# Patient Record
Sex: MALE | Race: WHITE | URBAN - METROPOLITAN AREA
[De-identification: names, ages, dates, MRNs, and addresses within clinical notes are randomized per-mention and may not be internally consistent; named-entity substitution may affect disease eponyms.]

---

## 2019-09-26 ENCOUNTER — OFFICE VISIT (OUTPATIENT)
Dept: FAMILY MEDICINE | Facility: CLINIC | Age: 79
End: 2019-09-26
Payer: MEDICARE

## 2019-09-26 DIAGNOSIS — R73.9 ELEVATED BLOOD SUGAR: ICD-10-CM

## 2019-09-26 DIAGNOSIS — E78.01 FAMILIAL HYPERCHOLESTEROLEMIA: ICD-10-CM

## 2019-09-26 DIAGNOSIS — Z13.29 SCREENING FOR ENDOCRINE DISORDER: ICD-10-CM

## 2019-09-26 DIAGNOSIS — I10 ESSENTIAL HYPERTENSION: Primary | ICD-10-CM

## 2019-09-26 DIAGNOSIS — Z13.89 SCREENING FOR BLOOD OR PROTEIN IN URINE: ICD-10-CM

## 2019-09-26 DIAGNOSIS — J30.9 ALLERGIC RHINITIS, UNSPECIFIED SEASONALITY, UNSPECIFIED TRIGGER: ICD-10-CM

## 2019-09-26 PROCEDURE — 99213 PR OFFICE/OUTPT VISIT, EST, LEVL III, 20-29 MIN: ICD-10-PCS | Mod: S$GLB,,, | Performed by: FAMILY MEDICINE

## 2019-09-26 PROCEDURE — 99213 OFFICE O/P EST LOW 20 MIN: CPT | Mod: S$GLB,,, | Performed by: FAMILY MEDICINE

## 2019-09-26 RX ORDER — AMLODIPINE AND BENAZEPRIL HYDROCHLORIDE 5; 20 MG/1; MG/1
CAPSULE ORAL
Refills: 3 | COMMUNITY
Start: 2019-07-29 | End: 2023-11-27 | Stop reason: SDUPTHER

## 2019-09-26 RX ORDER — OXYMETAZOLINE HCL 0.05 %
SPRAY, NON-AEROSOL (ML) NASAL
COMMUNITY
End: 2020-05-06

## 2019-09-26 RX ORDER — NAPROXEN SODIUM 220 MG/1
TABLET, FILM COATED ORAL
COMMUNITY

## 2019-09-26 RX ORDER — FLUTICASONE PROPIONATE 50 MCG
SPRAY, SUSPENSION (ML) NASAL
COMMUNITY
Start: 2014-11-05

## 2019-09-26 RX ORDER — PRAVASTATIN SODIUM 40 MG/1
TABLET ORAL
Refills: 3 | COMMUNITY
Start: 2019-07-16

## 2019-09-26 RX ORDER — ATENOLOL 25 MG/1
TABLET ORAL
Refills: 7 | COMMUNITY
Start: 2019-07-29 | End: 2024-01-18 | Stop reason: SDUPTHER

## 2019-09-26 NOTE — PROGRESS NOTES
SUBJECTIVE:    Patient ID: Watson Mcgee is a 78 y.o. male.    Chief Complaint: Hypertension (check up) and Hyperlipidemia    Pt here to checkup to HTN and allergic rhinitis.    HTN is controlled.  Continues to see Dr. Solis  Allergic rhinitis are controlled on flonase, when he uses it.      No visits with results within 6 Month(s) from this visit.   Latest known visit with results is:   No results found for any previous visit.       Past Medical History:   Diagnosis Date    Allergy     Hyperlipidemia     Hypertension      History reviewed. No pertinent surgical history.  History reviewed. No pertinent family history.    Marital Status: Single  Alcohol History:  reports that he drinks about 21.0 standard drinks of alcohol per week.  Tobacco History:  reports that he has never smoked. He has never used smokeless tobacco.  Drug History:  has no drug history on file.    Review of patient's allergies indicates:   Allergen Reactions    Clindamycin hcl      Other reaction(s): Unknown    Penicillin g potassium      Other reaction(s): Unknown       Current Outpatient Medications:     amlodipine-benazepril 5-20 mg (LOTREL) 5-20 mg per capsule, TK 1 C PO D, Disp: , Rfl: 3    aspirin 81 MG Chew, 1 tablet, Disp: , Rfl:     atenolol (TENORMIN) 25 MG tablet, TK 1 T PO D, Disp: , Rfl: 7    fluticasone propionate (FLONASE ALLERGY RELIEF) 50 mcg/actuation nasal spray, 1 spray in each nostril, Disp: , Rfl:     multivitamin-iron-folic acid (CENTRAL-ADRIENNE) Tab, 1 tablet, Disp: , Rfl:     oxymetazoline (AFRIN) 0.05 % nasal spray, 2 drops as needed, Disp: , Rfl:     pravastatin (PRAVACHOL) 40 MG tablet, TK 1 T PO  QHS, Disp: , Rfl: 3    Review of Systems   Constitutional: Negative for appetite change, fatigue, fever and unexpected weight change.   Respiratory: Negative for cough, chest tightness, shortness of breath and wheezing.    Cardiovascular: Negative for chest pain and leg swelling.   Gastrointestinal: Negative  "for abdominal pain, constipation, nausea and vomiting.   Genitourinary: Negative for decreased urine volume, difficulty urinating, dysuria and frequency.   Musculoskeletal: Negative for arthralgias, back pain, myalgias and neck pain.   Skin: Negative for rash.   Neurological: Negative for dizziness, weakness, numbness and headaches.   Hematological: Does not bruise/bleed easily.   Psychiatric/Behavioral: Negative for behavioral problems, sleep disturbance and suicidal ideas. The patient is not nervous/anxious.           Objective:      Vitals:    09/26/19 1359   BP: (P) 138/68   Pulse: (P) 80   Weight: (P) 107.4 kg (236 lb 12.8 oz)   Height: (P) 6' 2" (1.88 m)     Body mass index is 30.4 kg/m² (pended).  Physical Exam   Constitutional: He is oriented to person, place, and time. He appears well-developed and well-nourished. No distress.   HENT:   Head: Normocephalic and atraumatic.   Neck: Neck supple. No thyromegaly present.   Cardiovascular: Normal rate, regular rhythm and normal heart sounds. Exam reveals no friction rub.   No murmur heard.  Pulmonary/Chest: Effort normal and breath sounds normal. He has no wheezes. He has no rales.   Abdominal: Soft. Bowel sounds are normal. He exhibits no distension. There is no tenderness.   Musculoskeletal: He exhibits no edema.   Lymphadenopathy:     He has no cervical adenopathy.   Neurological: He is alert and oriented to person, place, and time.   Skin: Skin is warm and dry. No rash noted.   Psychiatric: He has a normal mood and affect. His speech is normal and behavior is normal. Judgment and thought content normal.   Vitals reviewed.        Assessment:       1. Essential hypertension    2. Allergic rhinitis, unspecified seasonality, unspecified trigger    3. Elevated blood sugar    4. Screening for blood or protein in urine    5. Familial hypercholesterolemia    6. Screening for endocrine disorder         Plan:       Essential hypertension  Comments:  Blood pressure " controlled. Will continue to monitor, labs.  Orders:  -     Comprehensive metabolic panel; Future; Expected date: 09/26/2019  -     Microalbumin/creatinine urine ratio; Future; Expected date: 09/26/2019  -     Urinalysis; Future; Expected date: 09/26/2019    Allergic rhinitis, unspecified seasonality, unspecified trigger  Comments:  Controlled on prn flonase.    Elevated blood sugar  Comments:  Has had elevated fasting blood sugar, will screen for diabetes  Orders:  -     Hemoglobin A1c; Future; Expected date: 09/26/2019    Screening for blood or protein in urine  -     Microalbumin/creatinine urine ratio; Future; Expected date: 09/26/2019  -     Urinalysis; Future; Expected date: 09/26/2019    Familial hypercholesterolemia  -     Lipid panel; Future; Expected date: 09/26/2019    Screening for endocrine disorder  -     TSH w/reflex to FT4; Future; Expected date: 09/26/2019    Labs have been ordered for monitoring of chronic condtions.    Follow up in about 6 months (around 3/26/2020) for HTN, HLD.

## 2019-10-29 ENCOUNTER — TELEPHONE (OUTPATIENT)
Dept: FAMILY MEDICINE | Facility: CLINIC | Age: 79
End: 2019-10-29

## 2019-10-30 NOTE — TELEPHONE ENCOUNTER
Spoke to patient that fasting lab is due. Patient states that Dr Gongora told him to get labs done before March visit.

## 2020-03-18 ENCOUNTER — TELEPHONE (OUTPATIENT)
Dept: FAMILY MEDICINE | Facility: CLINIC | Age: 80
End: 2020-03-18

## 2020-03-18 DIAGNOSIS — Z13.29 SCREENING FOR ENDOCRINE DISORDER: ICD-10-CM

## 2020-03-18 DIAGNOSIS — I10 ESSENTIAL HYPERTENSION: ICD-10-CM

## 2020-03-18 DIAGNOSIS — E78.01 FAMILIAL HYPERCHOLESTEROLEMIA: ICD-10-CM

## 2020-03-18 DIAGNOSIS — Z13.89 SCREENING FOR BLOOD OR PROTEIN IN URINE: Primary | ICD-10-CM

## 2020-03-18 DIAGNOSIS — R73.9 ELEVATED BLOOD SUGAR: ICD-10-CM

## 2020-03-18 NOTE — TELEPHONE ENCOUNTER
Spoke with pt - Pt informed that his lab orders were put in to be completed at [facility]. Pt states that he goes to [facility] and is requesting that those orders be sent to [facility] because he wants to complete them tomorrow.KARO

## 2020-03-18 NOTE — TELEPHONE ENCOUNTER
----- Message from Toyin Tovar sent at 3/18/2020  8:42 AM CDT -----  Contact: Watson Mcgee   Pt says that he went to [facility] about half an hour ago to get his labs done and [facility] didn't have anything in the system . Pt would like for lab orders to be put into the system please.   Pt# [phone]

## 2020-03-20 LAB
ALBUMIN SERPL-MCNC: 4.5 G/DL (ref 3.6–5.1)
ALBUMIN/CREAT UR: 5 MCG/MG CREAT
ALBUMIN/GLOB SERPL: 1.9 (CALC) (ref 1–2.5)
ALP SERPL-CCNC: 70 U/L (ref 35–144)
ALT SERPL-CCNC: 23 U/L (ref 9–46)
APPEARANCE UR: CLEAR
AST SERPL-CCNC: 17 U/L (ref 10–35)
BILIRUB SERPL-MCNC: 0.8 MG/DL (ref 0.2–1.2)
BILIRUB UR QL STRIP: NEGATIVE
BUN SERPL-MCNC: 12 MG/DL (ref 7–25)
BUN/CREAT SERPL: ABNORMAL (CALC) (ref 6–22)
CALCIUM SERPL-MCNC: 9.7 MG/DL (ref 8.6–10.3)
CHLORIDE SERPL-SCNC: 103 MMOL/L (ref 98–110)
CHOLEST SERPL-MCNC: 156 MG/DL
CHOLEST/HDLC SERPL: 2.6 (CALC)
CO2 SERPL-SCNC: 28 MMOL/L (ref 20–32)
COLOR UR: YELLOW
CREAT SERPL-MCNC: 0.97 MG/DL (ref 0.7–1.11)
CREAT UR-MCNC: 133 MG/DL (ref 20–320)
GFRSERPLBLD MDRD-ARVRAT: 73 ML/MIN/1.73M2
GLOBULIN SER CALC-MCNC: 2.4 G/DL (CALC) (ref 1.9–3.7)
GLUCOSE SERPL-MCNC: 122 MG/DL (ref 65–99)
GLUCOSE UR QL STRIP: NEGATIVE
HBA1C MFR BLD: 5.8 % OF TOTAL HGB
HDLC SERPL-MCNC: 59 MG/DL
HGB UR QL STRIP: NEGATIVE
KETONES UR QL STRIP: NEGATIVE
LDLC SERPL CALC-MCNC: 84 MG/DL (CALC)
LEUKOCYTE ESTERASE UR QL STRIP: NEGATIVE
MICROALBUMIN UR-MCNC: 0.7 MG/DL
NITRITE UR QL STRIP: NEGATIVE
NONHDLC SERPL-MCNC: 97 MG/DL (CALC)
PH UR STRIP: 7.5 [PH] (ref 5–8)
POTASSIUM SERPL-SCNC: 4.9 MMOL/L (ref 3.5–5.3)
PROT SERPL-MCNC: 6.9 G/DL (ref 6.1–8.1)
PROT UR QL STRIP: NEGATIVE
SODIUM SERPL-SCNC: 138 MMOL/L (ref 135–146)
SP GR UR STRIP: 1.02 (ref 1–1.03)
TRIGL SERPL-MCNC: 58 MG/DL
TSH SERPL-ACNC: 0.73 MIU/L (ref 0.4–4.5)

## 2020-03-24 ENCOUNTER — TELEPHONE (OUTPATIENT)
Dept: FAMILY MEDICINE | Facility: CLINIC | Age: 80
End: 2020-03-24

## 2020-03-24 NOTE — TELEPHONE ENCOUNTER
----- Message from Jessie Lucas MA sent at 3/24/2020 11:26 AM CDT -----  Pt is requesting verification of his appt on 2/26 @ 2:00p.    Pt - [phone]

## 2020-03-24 NOTE — TELEPHONE ENCOUNTER
Spoke with pt - Pt rescheduled appt, doesn't have access to a smart phone or computer with a webcam. Pt states that he doesn't have any need for refills at this time but would like the results of his lab work he just completed and also request that those labs be faxed to his cardiologist Dr. Solis. KARO      Labs faxed to Dr. Solis

## 2020-03-25 ENCOUNTER — TELEPHONE (OUTPATIENT)
Dept: FAMILY MEDICINE | Facility: CLINIC | Age: 80
End: 2020-03-25

## 2020-03-25 NOTE — TELEPHONE ENCOUNTER
----- Message from Kel Gongora MD sent at 3/24/2020  4:51 PM CDT -----  Let pt know that     1. the cholesterol panel is normal limits.   2. The liver and kidney function are normal.    3. The glucose is elevated at 122. Your A1c is 5.8.  Let him know that diabetes is diagnosed at a blood sugar of 126 and/or HbA1c of 6.5%. He needs to decrease his concentrated sweets and starches, and exercise regularly.  4. The patient is not anemic.   5. The urinalysis is normal. He is not spilling any excess protein in his urine  6. The thyroid panel is normal.

## 2020-04-21 ENCOUNTER — TELEPHONE (OUTPATIENT)
Dept: FAMILY MEDICINE | Facility: CLINIC | Age: 80
End: 2020-04-21

## 2020-04-21 NOTE — TELEPHONE ENCOUNTER
Spoke to patient that we are encouraging use of portal. States that is not interested in signing up

## 2020-05-07 ENCOUNTER — OFFICE VISIT (OUTPATIENT)
Dept: FAMILY MEDICINE | Facility: CLINIC | Age: 80
End: 2020-05-07
Payer: MEDICARE

## 2020-05-07 DIAGNOSIS — R73.01 IFG (IMPAIRED FASTING GLUCOSE): Primary | ICD-10-CM

## 2020-05-07 DIAGNOSIS — I10 ESSENTIAL HYPERTENSION: ICD-10-CM

## 2020-05-07 DIAGNOSIS — J30.9 ALLERGIC RHINITIS, UNSPECIFIED SEASONALITY, UNSPECIFIED TRIGGER: ICD-10-CM

## 2020-05-07 DIAGNOSIS — E78.2 MIXED HYPERLIPIDEMIA: ICD-10-CM

## 2020-05-07 PROCEDURE — 99442 PR PHYSICIAN TELEPHONE EVALUATION 11-20 MIN: ICD-10-PCS | Mod: 95,,, | Performed by: FAMILY MEDICINE

## 2020-05-07 PROCEDURE — 99442 PR PHYSICIAN TELEPHONE EVALUATION 11-20 MIN: CPT | Mod: 95,,, | Performed by: FAMILY MEDICINE

## 2020-05-07 NOTE — PROGRESS NOTES
Subjective:        The chief complaint leading to consultation is: HTN, Allergies  The patient location is:  Home  Visit type: Virtual visit with synchronous audio/video or audio only  This was a phone conversation in lieu of in-person visit due to the coronavirus emergency. Patient acknowledged and agreed to the telephone encounter.     Pt talked to in order to checkup to HTN and allergic rhinitis.    Pt has a blood pressure cuff at home. Thinks its the machine, thinks its old.  BP has been 160s/60s.  Has not seen Dr. Solis since Jan.  Continues to take ASA.   Bikes for exercise    Allergic rhinitis are doing on nasacort otc.       Telephone on 03/18/2020   Component Date Value Ref Range Status    Cholesterol 03/19/2020 156  <200 mg/dL Final    HDL 03/19/2020 59  > OR = 40 mg/dL Final    Triglycerides 03/19/2020 58  <150 mg/dL Final    LDL Cholesterol 03/19/2020 84  mg/dL (calc) Final    Hdl/Cholesterol Ratio 03/19/2020 2.6  <5.0 (calc) Final    Non HDL Chol. (LDL+VLDL) 03/19/2020 97  <130 mg/dL (calc) Final    Creatinine, Random Ur 03/19/2020 133  20 - 320 mg/dL Final    Microalb, Ur 03/19/2020 0.7  See Note: mg/dL Final    Microalb Creat Ratio 03/19/2020 5  <30 mcg/mg creat Final    Hemoglobin A1C 03/19/2020 5.8* <5.7 % of total Hgb Final    Glucose 03/19/2020 122* 65 - 99 mg/dL Final    BUN, Bld 03/19/2020 12  7 - 25 mg/dL Final    Creatinine 03/19/2020 0.97  0.70 - 1.11 mg/dL Final    eGFR if non African American 03/19/2020 73  > OR = 60 mL/min/1.73m2 Final    eGFR if African American 03/19/2020 85  > OR = 60 mL/min/1.73m2 Final    BUN/Creatinine Ratio 03/19/2020 NOT APPLICABLE  6 - 22 (calc) Final    Sodium 03/19/2020 138  135 - 146 mmol/L Final    Potassium 03/19/2020 4.9  3.5 - 5.3 mmol/L Final    Chloride 03/19/2020 103  98 - 110 mmol/L Final    CO2 03/19/2020 28  20 - 32 mmol/L Final    Calcium 03/19/2020 9.7  8.6 - 10.3 mg/dL Final    Total Protein 03/19/2020 6.9  6.1 - 8.1  g/dL Final    Albumin 03/19/2020 4.5  3.6 - 5.1 g/dL Final    Globulin, Total 03/19/2020 2.4  1.9 - 3.7 g/dL (calc) Final    Albumin/Globulin Ratio 03/19/2020 1.9  1.0 - 2.5 (calc) Final    Total Bilirubin 03/19/2020 0.8  0.2 - 1.2 mg/dL Final    Alkaline Phosphatase 03/19/2020 70  35 - 144 U/L Final    AST 03/19/2020 17  10 - 35 U/L Final    ALT 03/19/2020 23  9 - 46 U/L Final    TSH w/reflex to FT4 03/19/2020 0.73  0.40 - 4.50 mIU/L Final    Color, UA 03/19/2020 YELLOW  YELLOW Final    Appearance, UA 03/19/2020 CLEAR  CLEAR Final    Specific [place], UA 03/19/2020 1.018  1.001 - 1.035 Final    pH, UA 03/19/2020 7.5  5.0 - 8.0 Final    Glucose, UA 03/19/2020 NEGATIVE  NEGATIVE Final    Bilirubin, UA 03/19/2020 NEGATIVE  NEGATIVE Final    Ketones, UA 03/19/2020 NEGATIVE  NEGATIVE Final    Occult Blood UA 03/19/2020 NEGATIVE  NEGATIVE Final    Protein, UA 03/19/2020 NEGATIVE  NEGATIVE Final    Nitrite, UA 03/19/2020 NEGATIVE  NEGATIVE Final    Leukocytes, UA 03/19/2020 NEGATIVE  NEGATIVE Final       History reviewed. No pertinent surgical history.  Past Medical History:   Diagnosis Date    Allergy     Hyperlipidemia     Hypertension      History reviewed. No pertinent family history.     Social History:   Marital Status: Single  Alcohol History:  reports that he drinks about 21.0 standard drinks of alcohol per week.  Tobacco History:  reports that he has never smoked. He has never used smokeless tobacco.  Drug History:  has no drug history on file.    Review of patient's allergies indicates:   Allergen Reactions    Clindamycin hcl      Other reaction(s): Unknown    Penicillin g potassium      Other reaction(s): Unknown       Current Outpatient Medications   Medication Sig Dispense Refill    amlodipine-benazepril 5-20 mg (LOTREL) 5-20 mg per capsule TK 1 C PO D  3    aspirin 81 MG Chew 1 tablet      atenolol (TENORMIN) 25 MG tablet TK 1 T PO D  7    fluticasone propionate (FLONASE ALLERGY  RELIEF) 50 mcg/actuation nasal spray 1 spray in each nostril      multivitamin-iron-folic acid (CENTRAL-ADRIENNE) Tab 1 tablet      pravastatin (PRAVACHOL) 40 MG tablet TK 1 T PO  QHS  3     No current facility-administered medications for this visit.        Review of Systems   Constitutional: Negative for appetite change, fatigue, fever and unexpected weight change.   Respiratory: Negative for cough, chest tightness, shortness of breath and wheezing.    Cardiovascular: Negative for chest pain and leg swelling.   Gastrointestinal: Negative for abdominal pain, constipation, nausea and vomiting.   Genitourinary: Negative for decreased urine volume, difficulty urinating, dysuria and frequency.   Musculoskeletal: Positive for arthralgias. Negative for back pain, myalgias and neck pain.   Skin: Negative for rash.   Neurological: Negative for dizziness, weakness, numbness and headaches.   Hematological: Does not bruise/bleed easily.   Psychiatric/Behavioral: Negative for behavioral problems, sleep disturbance and suicidal ideas. The patient is not nervous/anxious.          Objective:        Physical Exam:   Physical Exam         Assessment:       1. IFG (impaired fasting glucose)    2. Allergic rhinitis, unspecified seasonality, unspecified trigger    3. Essential hypertension    4. Mixed hyperlipidemia      Plan:   IFG (impaired fasting glucose)  Comments:  Stable. To continue regular exercise, and low carbohydrate diet. Will continue to monitor HbA1c adn fBS.   Orders:  -     Basic metabolic panel; Future; Expected date: 05/07/2020  -     Hemoglobin A1C; Future; Expected date: 05/07/2020    Allergic rhinitis, unspecified seasonality, unspecified trigger  Comments:  Controlled. To continue nasacort prn. Will continue to monitor symptoms    Essential hypertension  Comments:  Active. Encouraged to get a new BP cuff for home monitoring. To continue to follow with Dr. Solis    Mixed hyperlipidemia  Comments:  Controlled.  LDL 84. To continue statin.    Other  Lab results discussed and reviewed with patient.  Labs have been ordered for monitoring of chronic conditions, just before next visit.    Follow up in about 6 months (around 11/7/2020) for HTN, IFG, Allergies.    Total time spent with patient: 14 miin    Each patient to whom he or she provides medical services by telemedicine is:  (1) informed of the relationship between the physician and patient and the respective role of any other health care provider with respect to management of the patient; and (2) notified that he or she may decline to receive medical services by telemedicine and may withdraw from such care at any time.    This note was created using [employer] voice recognition software that occasionally misinterprets phrases or words.

## 2020-05-13 ENCOUNTER — TELEPHONE (OUTPATIENT)
Dept: FAMILY MEDICINE | Facility: CLINIC | Age: 80
End: 2020-05-13

## 2020-05-13 NOTE — TELEPHONE ENCOUNTER
----- Message from Js Alicia LPN sent at 5/13/2020  1:34 PM CDT -----  Please schedule  ----- Message -----  From: Kel Gongora MD  Sent: 5/7/2020   2:42 PM CDT  To: Js Alicia LPN    6m OV (HTN, IFG, Allergies)  ## Send copy of labs to Dr. Inés Solis

## 2020-05-14 ENCOUNTER — TELEPHONE (OUTPATIENT)
Dept: FAMILY MEDICINE | Facility: CLINIC | Age: 80
End: 2020-05-14

## 2020-05-14 NOTE — TELEPHONE ENCOUNTER
Spoke to pt to see about getting him scheduled for his 6m OV (HTN, IFG, Allergies). I was able to schedule pt for 11/16. Right faxed labs to Dr. Inés Solis

## 2020-05-14 NOTE — TELEPHONE ENCOUNTER
----- Message from Js Alicia LPN sent at 5/14/2020  9:18 AM CDT -----  Please schedule  ----- Message -----  From: Kel Gongora MD  Sent: 5/7/2020   2:42 PM CDT  To: Js Alicia LPN    6m OV (HTN, IFG, Allergies)  ## Send copy of labs to Dr. Inés Solis

## 2020-11-03 ENCOUNTER — TELEPHONE (OUTPATIENT)
Dept: FAMILY MEDICINE | Facility: CLINIC | Age: 80
End: 2020-11-03

## 2020-11-06 LAB
BUN SERPL-MCNC: 11 MG/DL (ref 7–25)
BUN/CREAT SERPL: ABNORMAL (CALC) (ref 6–22)
CALCIUM SERPL-MCNC: 9.5 MG/DL (ref 8.6–10.3)
CHLORIDE SERPL-SCNC: 102 MMOL/L (ref 98–110)
CO2 SERPL-SCNC: 29 MMOL/L (ref 20–32)
CREAT SERPL-MCNC: 0.88 MG/DL (ref 0.7–1.11)
GFRSERPLBLD MDRD-ARVRAT: 81 ML/MIN/1.73M2
GLUCOSE SERPL-MCNC: 126 MG/DL (ref 65–99)
HBA1C MFR BLD: 5.6 % OF TOTAL HGB
POTASSIUM SERPL-SCNC: 4.9 MMOL/L (ref 3.5–5.3)
SODIUM SERPL-SCNC: 139 MMOL/L (ref 135–146)

## 2021-01-27 ENCOUNTER — OFFICE VISIT (OUTPATIENT)
Dept: FAMILY MEDICINE | Facility: CLINIC | Age: 81
End: 2021-01-27
Payer: MEDICARE

## 2021-01-27 VITALS
DIASTOLIC BLOOD PRESSURE: 62 MMHG | HEART RATE: 76 BPM | HEIGHT: 74 IN | WEIGHT: 232 LBS | SYSTOLIC BLOOD PRESSURE: 138 MMHG | BODY MASS INDEX: 29.77 KG/M2

## 2021-01-27 DIAGNOSIS — J30.9 ALLERGIC RHINITIS, UNSPECIFIED SEASONALITY, UNSPECIFIED TRIGGER: ICD-10-CM

## 2021-01-27 DIAGNOSIS — R73.01 IFG (IMPAIRED FASTING GLUCOSE): ICD-10-CM

## 2021-01-27 DIAGNOSIS — I10 ESSENTIAL HYPERTENSION: Primary | ICD-10-CM

## 2021-01-27 DIAGNOSIS — Z79.899 LONG-TERM USE OF HIGH-RISK MEDICATION: ICD-10-CM

## 2021-01-27 PROCEDURE — 99214 PR OFFICE/OUTPT VISIT, EST, LEVL IV, 30-39 MIN: ICD-10-PCS | Mod: S$GLB,,, | Performed by: FAMILY MEDICINE

## 2021-01-27 PROCEDURE — 99214 OFFICE O/P EST MOD 30 MIN: CPT | Mod: S$GLB,,, | Performed by: FAMILY MEDICINE

## 2021-07-13 ENCOUNTER — TELEPHONE (OUTPATIENT)
Dept: FAMILY MEDICINE | Facility: CLINIC | Age: 81
End: 2021-07-13

## 2021-07-16 LAB
ALBUMIN SERPL-MCNC: 4.3 G/DL (ref 3.6–5.1)
ALBUMIN/CREAT UR: 17 MCG/MG CREAT
ALBUMIN/GLOB SERPL: 1.5 (CALC) (ref 1–2.5)
ALP SERPL-CCNC: 78 U/L (ref 35–144)
ALT SERPL-CCNC: 25 U/L (ref 9–46)
APPEARANCE UR: CLEAR
AST SERPL-CCNC: 16 U/L (ref 10–35)
BACTERIA #/AREA URNS HPF: NORMAL /HPF
BACTERIA UR CULT: NORMAL
BASOPHILS # BLD AUTO: 43 CELLS/UL (ref 0–200)
BASOPHILS NFR BLD AUTO: 0.5 %
BILIRUB SERPL-MCNC: 0.8 MG/DL (ref 0.2–1.2)
BILIRUB UR QL STRIP: NEGATIVE
BUN SERPL-MCNC: 14 MG/DL (ref 7–25)
BUN/CREAT SERPL: ABNORMAL (CALC) (ref 6–22)
CALCIUM SERPL-MCNC: 9.5 MG/DL (ref 8.6–10.3)
CHLORIDE SERPL-SCNC: 101 MMOL/L (ref 98–110)
CHOLEST SERPL-MCNC: 164 MG/DL
CHOLEST/HDLC SERPL: 2.7 (CALC)
CO2 SERPL-SCNC: 25 MMOL/L (ref 20–32)
COLOR UR: NORMAL
CREAT SERPL-MCNC: 0.89 MG/DL (ref 0.7–1.11)
CREAT UR-MCNC: 145 MG/DL (ref 20–320)
EOSINOPHIL # BLD AUTO: 95 CELLS/UL (ref 15–500)
EOSINOPHIL NFR BLD AUTO: 1.1 %
ERYTHROCYTE [DISTWIDTH] IN BLOOD BY AUTOMATED COUNT: 12.1 % (ref 11–15)
GLOBULIN SER CALC-MCNC: 2.8 G/DL (CALC) (ref 1.9–3.7)
GLUCOSE SERPL-MCNC: 130 MG/DL (ref 65–99)
GLUCOSE UR QL STRIP: NEGATIVE
HBA1C MFR BLD: 5.6 % OF TOTAL HGB
HCT VFR BLD AUTO: 46.3 % (ref 38.5–50)
HDLC SERPL-MCNC: 60 MG/DL
HGB BLD-MCNC: 15.4 G/DL (ref 13.2–17.1)
HGB UR QL STRIP: NEGATIVE
HYALINE CASTS #/AREA URNS LPF: NORMAL /LPF
KETONES UR QL STRIP: NEGATIVE
LDLC SERPL CALC-MCNC: 88 MG/DL (CALC)
LEUKOCYTE ESTERASE UR QL STRIP: NEGATIVE
LYMPHOCYTES # BLD AUTO: 1256 CELLS/UL (ref 850–3900)
LYMPHOCYTES NFR BLD AUTO: 14.6 %
MCH RBC QN AUTO: 32.7 PG (ref 27–33)
MCHC RBC AUTO-ENTMCNC: 33.3 G/DL (ref 32–36)
MCV RBC AUTO: 98.3 FL (ref 80–100)
MICROALBUMIN UR-MCNC: 2.4 MG/DL
MONOCYTES # BLD AUTO: 705 CELLS/UL (ref 200–950)
MONOCYTES NFR BLD AUTO: 8.2 %
NEUTROPHILS # BLD AUTO: 6502 CELLS/UL (ref 1500–7800)
NEUTROPHILS NFR BLD AUTO: 75.6 %
NITRITE UR QL STRIP: NEGATIVE
NONHDLC SERPL-MCNC: 104 MG/DL (CALC)
PH UR STRIP: 7 [PH] (ref 5–8)
PLATELET # BLD AUTO: 289 THOUSAND/UL (ref 140–400)
PMV BLD REES-ECKER: 9.7 FL (ref 7.5–12.5)
POTASSIUM SERPL-SCNC: 4.9 MMOL/L (ref 3.5–5.3)
PROT SERPL-MCNC: 7.1 G/DL (ref 6.1–8.1)
PROT UR QL STRIP: NEGATIVE
RBC # BLD AUTO: 4.71 MILLION/UL (ref 4.2–5.8)
RBC #/AREA URNS HPF: NORMAL /HPF
SODIUM SERPL-SCNC: 138 MMOL/L (ref 135–146)
SP GR UR STRIP: 1.02 (ref 1–1.03)
SQUAMOUS #/AREA URNS HPF: NORMAL /HPF
TRIGL SERPL-MCNC: 71 MG/DL
WBC # BLD AUTO: 8.6 THOUSAND/UL (ref 3.8–10.8)
WBC #/AREA URNS HPF: NORMAL /HPF

## 2021-07-27 ENCOUNTER — OFFICE VISIT (OUTPATIENT)
Dept: FAMILY MEDICINE | Facility: CLINIC | Age: 81
End: 2021-07-27
Payer: MEDICARE

## 2021-07-27 VITALS
SYSTOLIC BLOOD PRESSURE: 138 MMHG | DIASTOLIC BLOOD PRESSURE: 68 MMHG | WEIGHT: 231.38 LBS | BODY MASS INDEX: 29.69 KG/M2 | HEIGHT: 74 IN | HEART RATE: 84 BPM

## 2021-07-27 DIAGNOSIS — I10 ESSENTIAL HYPERTENSION: Primary | ICD-10-CM

## 2021-07-27 DIAGNOSIS — E78.2 MIXED HYPERLIPIDEMIA: ICD-10-CM

## 2021-07-27 DIAGNOSIS — Z79.899 LONG-TERM USE OF HIGH-RISK MEDICATION: ICD-10-CM

## 2021-07-27 DIAGNOSIS — M17.10 ARTHRITIS OF KNEE: ICD-10-CM

## 2021-07-27 DIAGNOSIS — E11.9 TYPE 2 DIABETES MELLITUS WITHOUT COMPLICATION, WITHOUT LONG-TERM CURRENT USE OF INSULIN: ICD-10-CM

## 2021-07-27 PROCEDURE — 99214 OFFICE O/P EST MOD 30 MIN: CPT | Mod: S$GLB,,, | Performed by: FAMILY MEDICINE

## 2021-07-27 PROCEDURE — 99214 PR OFFICE/OUTPT VISIT, EST, LEVL IV, 30-39 MIN: ICD-10-PCS | Mod: S$GLB,,, | Performed by: FAMILY MEDICINE

## 2021-07-27 RX ORDER — FERROUS SULFATE 324(65)MG
324 TABLET, DELAYED RELEASE (ENTERIC COATED) ORAL DAILY
COMMUNITY
End: 2021-07-27

## 2021-07-27 RX ORDER — LANCETS
EACH MISCELLANEOUS
Qty: 100 EACH | Refills: 0 | Status: SHIPPED | OUTPATIENT
Start: 2021-07-27

## 2021-07-27 RX ORDER — INSULIN PUMP SYRINGE, 3 ML
EACH MISCELLANEOUS
Qty: 1 EACH | Refills: 0 | Status: SHIPPED | OUTPATIENT
Start: 2021-07-27

## 2022-01-12 LAB
ALBUMIN SERPL-MCNC: 4.4 G/DL (ref 3.6–5.1)
ALBUMIN/GLOB SERPL: 1.6 (CALC) (ref 1–2.5)
ALP SERPL-CCNC: 80 U/L (ref 35–144)
ALT SERPL-CCNC: 23 U/L (ref 9–46)
AST SERPL-CCNC: 15 U/L (ref 10–35)
BILIRUB SERPL-MCNC: 0.8 MG/DL (ref 0.2–1.2)
BUN SERPL-MCNC: 9 MG/DL (ref 7–25)
BUN/CREAT SERPL: ABNORMAL (CALC) (ref 6–22)
CALCIUM SERPL-MCNC: 9.5 MG/DL (ref 8.6–10.3)
CHLORIDE SERPL-SCNC: 103 MMOL/L (ref 98–110)
CO2 SERPL-SCNC: 30 MMOL/L (ref 20–32)
CREAT SERPL-MCNC: 0.8 MG/DL (ref 0.7–1.11)
GLOBULIN SER CALC-MCNC: 2.8 G/DL (CALC) (ref 1.9–3.7)
GLUCOSE SERPL-MCNC: 122 MG/DL (ref 65–99)
HBA1C MFR BLD: 5.6 % OF TOTAL HGB
POTASSIUM SERPL-SCNC: 4.7 MMOL/L (ref 3.5–5.3)
PROT SERPL-MCNC: 7.2 G/DL (ref 6.1–8.1)
SODIUM SERPL-SCNC: 140 MMOL/L (ref 135–146)

## 2022-01-27 ENCOUNTER — OFFICE VISIT (OUTPATIENT)
Dept: FAMILY MEDICINE | Facility: CLINIC | Age: 82
End: 2022-01-27
Payer: MEDICARE

## 2022-01-27 ENCOUNTER — TELEPHONE (OUTPATIENT)
Dept: FAMILY MEDICINE | Facility: CLINIC | Age: 82
End: 2022-01-27

## 2022-01-27 VITALS
SYSTOLIC BLOOD PRESSURE: 134 MMHG | HEART RATE: 74 BPM | OXYGEN SATURATION: 98 % | BODY MASS INDEX: 28.49 KG/M2 | WEIGHT: 222 LBS | HEIGHT: 74 IN | DIASTOLIC BLOOD PRESSURE: 78 MMHG

## 2022-01-27 DIAGNOSIS — M17.10 ARTHRITIS OF KNEE: ICD-10-CM

## 2022-01-27 DIAGNOSIS — Z12.5 SCREENING FOR PROSTATE CANCER: ICD-10-CM

## 2022-01-27 DIAGNOSIS — Z13.89 SCREENING FOR BLOOD OR PROTEIN IN URINE: ICD-10-CM

## 2022-01-27 DIAGNOSIS — J30.9 ALLERGIC RHINITIS, UNSPECIFIED SEASONALITY, UNSPECIFIED TRIGGER: ICD-10-CM

## 2022-01-27 DIAGNOSIS — Z79.899 LONG-TERM USE OF HIGH-RISK MEDICATION: ICD-10-CM

## 2022-01-27 DIAGNOSIS — Z13.29 SCREENING FOR ENDOCRINE DISORDER: ICD-10-CM

## 2022-01-27 DIAGNOSIS — E11.9 TYPE 2 DIABETES MELLITUS WITHOUT COMPLICATION, WITHOUT LONG-TERM CURRENT USE OF INSULIN: ICD-10-CM

## 2022-01-27 DIAGNOSIS — I10 ESSENTIAL HYPERTENSION: Primary | ICD-10-CM

## 2022-01-27 PROCEDURE — 99214 PR OFFICE/OUTPT VISIT, EST, LEVL IV, 30-39 MIN: ICD-10-PCS | Mod: S$GLB,,, | Performed by: FAMILY MEDICINE

## 2022-01-27 PROCEDURE — 99214 OFFICE O/P EST MOD 30 MIN: CPT | Mod: S$GLB,,, | Performed by: FAMILY MEDICINE

## 2022-01-27 NOTE — TELEPHONE ENCOUNTER
----- Message from Kel Gongora MD sent at 1/27/2022  2:14 PM CST -----  Please send copy of labs to Dr. Solis

## 2022-01-27 NOTE — PROGRESS NOTES
SUBJECTIVE:    Patient ID: Watson Mcgee is a 81 y.o. male.    Chief Complaint: 6 month follow up    Pt seen to in order to checkup acute and chronic conditions ( HTN and allergic rhinitis).    BP is doing ok. Denies CP/SOB/HA. Bikes for exercise. (Will,tracy)    Allergic rhinitis are doing on flonase otc.     Still has stiffness with his knees R>L. No longer hurting since taking Osteo Bioflex.  Doesn't take any otc nsaids.     Going to the bathroom about 4 times a night.  Gets about 2-3 hours a night. Rides his bike on the weekends.     Had labs done. fBS 122, A1c 5.6, Cr 0.80.      No visits with results within 6 Month(s) from this visit.   Latest known visit with results is:   Office Visit on 07/27/2021   Component Date Value Ref Range Status    Hemoglobin A1C 01/11/2022 5.6  <5.7 % of total Hgb Final    Glucose 01/11/2022 122* 65 - 99 mg/dL Final    BUN 01/11/2022 9  7 - 25 mg/dL Final    Creatinine 01/11/2022 0.80  0.70 - 1.11 mg/dL Final    eGFR if non African American 01/11/2022 83  > OR = 60 mL/min/1.73m2 Final    eGFR if  01/11/2022 96  > OR = 60 mL/min/1.73m2 Final    BUN/Creatinine Ratio 01/11/2022 NOT APPLICABLE  6 - 22 (calc) Final    Sodium 01/11/2022 140  135 - 146 mmol/L Final    Potassium 01/11/2022 4.7  3.5 - 5.3 mmol/L Final    Chloride 01/11/2022 103  98 - 110 mmol/L Final    CO2 01/11/2022 30  20 - 32 mmol/L Final    Calcium 01/11/2022 9.5  8.6 - 10.3 mg/dL Final    Total Protein 01/11/2022 7.2  6.1 - 8.1 g/dL Final    Albumin 01/11/2022 4.4  3.6 - 5.1 g/dL Final    Globulin, Total 01/11/2022 2.8  1.9 - 3.7 g/dL (calc) Final    Albumin/Globulin Ratio 01/11/2022 1.6  1.0 - 2.5 (calc) Final    Total Bilirubin 01/11/2022 0.8  0.2 - 1.2 mg/dL Final    Alkaline Phosphatase 01/11/2022 80  35 - 144 U/L Final    AST 01/11/2022 15  10 - 35 U/L Final    ALT 01/11/2022 23  9 - 46 U/L Final       Past Medical History:   Diagnosis Date    Allergy     Hyperlipidemia      Hypertension      Social History     Socioeconomic History    Marital status: Single   Tobacco Use    Smoking status: Never Smoker    Smokeless tobacco: Never Used   Substance and Sexual Activity    Alcohol use: Yes     Alcohol/week: 21.0 standard drinks     Types: 21 Glasses of wine per week     History reviewed. No pertinent surgical history.  History reviewed. No pertinent family history.    Review of patient's allergies indicates:   Allergen Reactions    Clindamycin hcl      Other reaction(s): Unknown    Penicillin g potassium      Other reaction(s): Unknown       Current Outpatient Medications:     rutin/hesp/bioflav/C/rildbn322 (BIOFLEX ORAL), Take 2 tablets by mouth once daily., Disp: , Rfl:     amlodipine-benazepril 5-20 mg (LOTREL) 5-20 mg per capsule, TK 1 C PO D, Disp: , Rfl: 3    aspirin 81 MG Chew, 1 tablet, Disp: , Rfl:     atenolol (TENORMIN) 25 MG tablet, TK 1 T PO D, Disp: , Rfl: 7    blood sugar diagnostic Strp, To check BG one time daily, to use with insurance preferred meter, Disp: 100 each, Rfl: 0    blood-glucose meter kit, To check BG one time daily, to use with insurance preferred meter, Disp: 1 each, Rfl: 0    fluticasone propionate (FLONASE ALLERGY RELIEF) 50 mcg/actuation nasal spray, 1 spray in each nostril, Disp: , Rfl:     lancets Misc, To check BG one time daily, to use with insurance preferred meter, Disp: 100 each, Rfl: 0    multivitamin-iron-folic acid (CENTRAL-ADRIENNE) Tab, 1 tablet, Disp: , Rfl:     pravastatin (PRAVACHOL) 40 MG tablet, TK 1 T PO  QHS, Disp: , Rfl: 3    Review of Systems   Constitutional: Negative for appetite change, fatigue, fever and unexpected weight change.   Respiratory: Negative for cough, chest tightness, shortness of breath and wheezing.    Cardiovascular: Negative for chest pain and leg swelling.   Gastrointestinal: Negative for abdominal pain, constipation, nausea and vomiting.        -heartburn   Genitourinary: Negative for decreased  "urine volume, difficulty urinating, dysuria and frequency.   Musculoskeletal: Positive for arthralgias. Negative for back pain, myalgias and neck pain.   Skin: Negative for rash.   Neurological: Negative for dizziness, weakness, numbness and headaches.   Hematological: Does not bruise/bleed easily.   Psychiatric/Behavioral: Negative for behavioral problems, sleep disturbance and suicidal ideas. The patient is not nervous/anxious.           Objective:      Vitals:    01/27/22 1347   BP: 134/78   Pulse: 74   SpO2: 98%   Weight: 100.7 kg (222 lb)   Height: 6' 2" (1.88 m)     Physical Exam  Vitals reviewed.   Constitutional:       General: He is not in acute distress.     Appearance: Normal appearance. He is well-developed.   HENT:      Head: Normocephalic and atraumatic.   Neck:      Thyroid: No thyromegaly.   Cardiovascular:      Rate and Rhythm: Normal rate and regular rhythm.      Heart sounds: Normal heart sounds. No murmur heard.  No friction rub.   Pulmonary:      Effort: Pulmonary effort is normal.      Breath sounds: Normal breath sounds. No wheezing or rales.   Abdominal:      General: Bowel sounds are normal. There is no distension.      Palpations: Abdomen is soft.      Tenderness: There is no abdominal tenderness.   Musculoskeletal:      Cervical back: Neck supple.   Lymphadenopathy:      Cervical: No cervical adenopathy.   Skin:     General: Skin is warm and dry.      Findings: No rash.   Neurological:      Mental Status: He is alert and oriented to person, place, and time.   Psychiatric:         Speech: Speech normal.         Behavior: Behavior normal.         Thought Content: Thought content normal.         Judgment: Judgment normal.           Assessment:       1. Essential hypertension    2. Type 2 diabetes mellitus without complication, without long-term current use of insulin    3. Allergic rhinitis, unspecified seasonality, unspecified trigger    4. Arthritis of knee    5. Long-term use of high-risk " medication    6. Screening for endocrine disorder    7. Screening for prostate cancer    8. Screening for blood or protein in urine         Plan:       Essential hypertension  Comments:  Controlled. Will continue to monitor BP on current medication.    Type 2 diabetes mellitus without complication, without long-term current use of insulin  Comments:  Controlled. A1c 5.6%. To continue ADA diet, regular exercise. Will continue to monitor A1c.   Orders:  -     Comprehensive Metabolic Panel; Future; Expected date: 01/27/2022  -     Lipid Panel; Future; Expected date: 01/27/2022  -     Microalbumin/Creatinine Ratio, Urine; Future; Expected date: 01/27/2022  -     Urinalysis, Reflex to Urine Culture Urine, Clean Catch; Future; Expected date: 01/27/2022  -     TSH w/reflex to FT4; Future; Expected date: 01/27/2022  -     CBC Auto Differential; Future; Expected date: 01/27/2022  -     PSA, Screening; Future; Expected date: 01/27/2022    Allergic rhinitis, unspecified seasonality, unspecified trigger  Comments:  Controlled. Will continue to monitor symptoms    Arthritis of knee  Comments:  Controlled. Will continue to monitor on conservative care.     Long-term use of high-risk medication  -     Comprehensive Metabolic Panel; Future; Expected date: 01/27/2022  -     Lipid Panel; Future; Expected date: 01/27/2022  -     Microalbumin/Creatinine Ratio, Urine; Future; Expected date: 01/27/2022  -     Urinalysis, Reflex to Urine Culture Urine, Clean Catch; Future; Expected date: 01/27/2022  -     TSH w/reflex to FT4; Future; Expected date: 01/27/2022  -     CBC Auto Differential; Future; Expected date: 01/27/2022  -     PSA, Screening; Future; Expected date: 01/27/2022    Screening for endocrine disorder  -     TSH w/reflex to FT4; Future; Expected date: 01/27/2022    Screening for prostate cancer  -     PSA, Screening; Future; Expected date: 01/27/2022    Screening for blood or protein in urine  -     Microalbumin/Creatinine  Ratio, Urine; Future; Expected date: 01/27/2022  -     Urinalysis, Reflex to Urine Culture Urine, Clean Catch; Future; Expected date: 01/27/2022    Other  Lab results discussed and reviewed with patient.  Labs and/or tests have been ordered for the evaluation/monitoring of acute/chronic conditions, to be done just before next visit.    Follow up in about 6 months (around 7/27/2022) for HTN, DM, Allergies, Arthritis, LABS.        1/27/2022 Kel Gongora

## 2022-05-13 ENCOUNTER — TELEPHONE (OUTPATIENT)
Dept: FAMILY MEDICINE | Facility: CLINIC | Age: 82
End: 2022-05-13

## 2022-05-13 NOTE — TELEPHONE ENCOUNTER
----- Message from Litzy Hannon sent at 5/13/2022  8:42 AM CDT -----  Patient called and stated that he cannot hear out of his right ear and he would like to come in and have the doctor take a look at it please give him a call at [phone]

## 2022-05-16 ENCOUNTER — OFFICE VISIT (OUTPATIENT)
Dept: FAMILY MEDICINE | Facility: CLINIC | Age: 82
End: 2022-05-16
Payer: MEDICARE

## 2022-05-16 VITALS
HEIGHT: 74 IN | SYSTOLIC BLOOD PRESSURE: 136 MMHG | WEIGHT: 223 LBS | HEART RATE: 72 BPM | BODY MASS INDEX: 28.62 KG/M2 | DIASTOLIC BLOOD PRESSURE: 68 MMHG

## 2022-05-16 DIAGNOSIS — S81.802A WOUND OF LEFT LOWER EXTREMITY, INITIAL ENCOUNTER: Primary | ICD-10-CM

## 2022-05-16 DIAGNOSIS — H61.23 BILATERAL IMPACTED CERUMEN: ICD-10-CM

## 2022-05-16 PROCEDURE — 99213 PR OFFICE/OUTPT VISIT, EST, LEVL III, 20-29 MIN: ICD-10-PCS | Mod: S$GLB,,, | Performed by: FAMILY MEDICINE

## 2022-05-16 PROCEDURE — 99213 OFFICE O/P EST LOW 20 MIN: CPT | Mod: S$GLB,,, | Performed by: FAMILY MEDICINE

## 2022-05-16 NOTE — PROGRESS NOTES
SUBJECTIVE:    Patient ID: Watson Mcgee is a 81 y.o. male.    Chief Complaint: Otalgia (Right ear feels clogged x4 days, no bottles// SW)    Pt c/o ear pain on the right.    Has been having clogged feeling in his right ear for 4 days. Tried otc med that didn't help, so he put water in it from the shower and thinks it helped some.    Pt also has a wound on his left leg that has been present for 10 days. Has some swelling as well. States redness has only been present for a day or so.      No visits with results within 6 Month(s) from this visit.   Latest known visit with results is:   Office Visit on 07/27/2021   Component Date Value Ref Range Status    Hemoglobin A1C 01/11/2022 5.6  <5.7 % of total Hgb Final    Glucose 01/11/2022 122 (A) 65 - 99 mg/dL Final    BUN 01/11/2022 9  7 - 25 mg/dL Final    Creatinine 01/11/2022 0.80  0.70 - 1.11 mg/dL Final    eGFR if non African American 01/11/2022 83  > OR = 60 mL/min/1.73m2 Final    eGFR if  01/11/2022 96  > OR = 60 mL/min/1.73m2 Final    BUN/Creatinine Ratio 01/11/2022 NOT APPLICABLE  6 - 22 (calc) Final    Sodium 01/11/2022 140  135 - 146 mmol/L Final    Potassium 01/11/2022 4.7  3.5 - 5.3 mmol/L Final    Chloride 01/11/2022 103  98 - 110 mmol/L Final    CO2 01/11/2022 30  20 - 32 mmol/L Final    Calcium 01/11/2022 9.5  8.6 - 10.3 mg/dL Final    Total Protein 01/11/2022 7.2  6.1 - 8.1 g/dL Final    Albumin 01/11/2022 4.4  3.6 - 5.1 g/dL Final    Globulin, Total 01/11/2022 2.8  1.9 - 3.7 g/dL (calc) Final    Albumin/Globulin Ratio 01/11/2022 1.6  1.0 - 2.5 (calc) Final    Total Bilirubin 01/11/2022 0.8  0.2 - 1.2 mg/dL Final    Alkaline Phosphatase 01/11/2022 80  35 - 144 U/L Final    AST 01/11/2022 15  10 - 35 U/L Final    ALT 01/11/2022 23  9 - 46 U/L Final       Past Medical History:   Diagnosis Date    Allergy     Hyperlipidemia     Hypertension      Social History     Socioeconomic History    Marital status: Single  Caller would like a Return call today from Kalina Bang regarding her recent visit discussion with her on 01/14/2022. (see additional info info)  Patient Name: Norma Beavers  Caller Name: Norma  Name of Facility: n/a  Callback Number: [phone] (M)  Best Availability: today please  Additional Info: Patient states the Meloxicam that Kalina Martinlisa prescribed last week is no longer effective for her pain and would like to know if she could call something stonger for her pain into the [facility] in [place] today. Please advise.  Did you confirm the message with the caller?: yes    Thank you,  Coreen Cam     Tobacco Use    Smoking status: Never Smoker    Smokeless tobacco: Never Used   Substance and Sexual Activity    Alcohol use: Yes     Alcohol/week: 21.0 standard drinks     Types: 21 Glasses of wine per week     History reviewed. No pertinent surgical history.  History reviewed. No pertinent family history.    Review of patient's allergies indicates:   Allergen Reactions    Clindamycin hcl      Other reaction(s): Unknown    Penicillin g potassium      Other reaction(s): Unknown       Current Outpatient Medications:     amlodipine-benazepril 5-20 mg (LOTREL) 5-20 mg per capsule, TK 1 C PO D, Disp: , Rfl: 3    aspirin 81 MG Chew, 1 tablet, Disp: , Rfl:     atenolol (TENORMIN) 25 MG tablet, TK 1 T PO D, Disp: , Rfl: 7    blood sugar diagnostic Strp, To check BG one time daily, to use with insurance preferred meter, Disp: 100 each, Rfl: 0    blood-glucose meter kit, To check BG one time daily, to use with insurance preferred meter, Disp: 1 each, Rfl: 0    fluticasone propionate (FLONASE ALLERGY RELIEF) 50 mcg/actuation nasal spray, 1 spray in each nostril, Disp: , Rfl:     lancets Misc, To check BG one time daily, to use with insurance preferred meter, Disp: 100 each, Rfl: 0    multivitamin-iron-folic acid (CENTRAL-ADRIENNE) Tab, 1 tablet, Disp: , Rfl:     mupirocin (BACTROBAN) 2 % ointment, Apply topically 2 (two) times daily., Disp: 22 g, Rfl: 0    pravastatin (PRAVACHOL) 40 MG tablet, TK 1 T PO  QHS, Disp: , Rfl: 3    rutin/hesp/bioflav/C/[id number] (BIOFLEX ORAL), Take 2 tablets by mouth once daily., Disp: , Rfl:     Review of Systems   Constitutional: Negative for appetite change, fatigue, fever and unexpected weight change.   HENT: Positive for ear pain.    Respiratory: Negative for cough, chest tightness, shortness of breath and wheezing.    Cardiovascular: Negative for chest pain and leg swelling.   Gastrointestinal: Negative for abdominal pain, constipation, nausea and vomiting.        -heartburn  "  Genitourinary: Negative for decreased urine volume, difficulty urinating, dysuria and frequency.   Musculoskeletal: Positive for arthralgias. Negative for back pain, myalgias and neck pain.   Skin: Negative for rash.   Neurological: Negative for dizziness, weakness, numbness and headaches.   Hematological: Does not bruise/bleed easily.   Psychiatric/Behavioral: Negative for behavioral problems, sleep disturbance and suicidal ideas. The patient is not nervous/anxious.           Objective:      Vitals:    05/16/22 1103   BP: 136/68   Pulse: 72   Weight: 101.2 kg (223 lb)   Height: 6' 2" (1.88 m)     Physical Exam  Vitals reviewed.   Constitutional:       General: He is not in acute distress.     Appearance: Normal appearance. He is well-developed and overweight.   HENT:      Head: Normocephalic and atraumatic.   Neck:      Thyroid: No thyromegaly.   Cardiovascular:      Rate and Rhythm: Normal rate and regular rhythm.      Heart sounds: Normal heart sounds. No murmur heard.    No friction rub.   Pulmonary:      Effort: Pulmonary effort is normal.      Breath sounds: Normal breath sounds. No wheezing or rales.   Abdominal:      General: Bowel sounds are normal. There is no distension.      Palpations: Abdomen is soft.      Tenderness: There is no abdominal tenderness.   Musculoskeletal:      Cervical back: Neck supple.   Lymphadenopathy:      Cervical: No cervical adenopathy.   Skin:     General: Skin is warm and dry.      Findings: Erythema (left lower leg, area marked) present. No rash.          Neurological:      Mental Status: He is alert and oriented to person, place, and time.   Psychiatric:         Speech: Speech normal.         Behavior: Behavior normal.         Thought Content: Thought content normal.         Judgment: Judgment normal.           Assessment:       1. Wound of left lower extremity, initial encounter    2. Bilateral impacted cerumen         Plan:       Wound of left lower extremity, initial " encounter  Comments:  Acute. Pt advised to notify office if leg wound worsened as he would need antibiotics.  Orders:  -     mupirocin (BACTROBAN) 2 % ointment; Apply topically 2 (two) times daily.  Dispense: 22 g; Refill: 0    Bilateral impacted cerumen  Comments:  Unable to remove cerumen without pain. Pt advised to use otc ear wax removal more often to help prevent cerumen      Follow up if symptoms worsen or fail to improve, for As scheduled.        5/31/2022 Kel Gongora

## 2022-05-31 RX ORDER — MUPIROCIN 20 MG/G
OINTMENT TOPICAL 2 TIMES DAILY
Qty: 22 G | Refills: 0 | Status: SHIPPED | OUTPATIENT
Start: 2022-05-31

## 2022-07-19 ENCOUNTER — TELEPHONE (OUTPATIENT)
Dept: FAMILY MEDICINE | Facility: CLINIC | Age: 82
End: 2022-07-19

## 2022-07-27 ENCOUNTER — OFFICE VISIT (OUTPATIENT)
Dept: FAMILY MEDICINE | Facility: CLINIC | Age: 82
End: 2022-07-27
Payer: MEDICARE

## 2022-07-27 VITALS
WEIGHT: 221 LBS | BODY MASS INDEX: 28.36 KG/M2 | DIASTOLIC BLOOD PRESSURE: 70 MMHG | OXYGEN SATURATION: 97 % | HEIGHT: 74 IN | HEART RATE: 75 BPM | SYSTOLIC BLOOD PRESSURE: 124 MMHG

## 2022-07-27 DIAGNOSIS — R73.01 IFG (IMPAIRED FASTING GLUCOSE): ICD-10-CM

## 2022-07-27 DIAGNOSIS — H91.91 HEARING LOSS OF RIGHT EAR, UNSPECIFIED HEARING LOSS TYPE: Primary | ICD-10-CM

## 2022-07-27 LAB
ALBUMIN SERPL-MCNC: 4.3 G/DL (ref 3.6–5.1)
ALBUMIN/CREAT UR: 13 MCG/MG CREAT
ALBUMIN/GLOB SERPL: 1.5 (CALC) (ref 1–2.5)
ALP SERPL-CCNC: 72 U/L (ref 35–144)
ALT SERPL-CCNC: 16 U/L (ref 9–46)
APPEARANCE UR: CLEAR
AST SERPL-CCNC: 15 U/L (ref 10–35)
BACTERIA #/AREA URNS HPF: NORMAL /HPF
BACTERIA UR CULT: NORMAL
BASOPHILS # BLD AUTO: 41 CELLS/UL (ref 0–200)
BASOPHILS NFR BLD AUTO: 0.6 %
BILIRUB SERPL-MCNC: 0.8 MG/DL (ref 0.2–1.2)
BILIRUB UR QL STRIP: NEGATIVE
BUN SERPL-MCNC: 11 MG/DL (ref 7–25)
BUN/CREAT SERPL: ABNORMAL (CALC) (ref 6–22)
CALCIUM SERPL-MCNC: 9.8 MG/DL (ref 8.6–10.3)
CHLORIDE SERPL-SCNC: 102 MMOL/L (ref 98–110)
CHOLEST SERPL-MCNC: 165 MG/DL
CHOLEST/HDLC SERPL: 2.7 (CALC)
CO2 SERPL-SCNC: 27 MMOL/L (ref 20–32)
COLOR UR: NORMAL
CREAT SERPL-MCNC: 0.84 MG/DL (ref 0.7–1.22)
CREAT UR-MCNC: 128 MG/DL (ref 20–320)
EGFR: 87 ML/MIN/1.73M2
EOSINOPHIL # BLD AUTO: 102 CELLS/UL (ref 15–500)
EOSINOPHIL NFR BLD AUTO: 1.5 %
ERYTHROCYTE [DISTWIDTH] IN BLOOD BY AUTOMATED COUNT: 11.8 % (ref 11–15)
GLOBULIN SER CALC-MCNC: 2.8 G/DL (CALC) (ref 1.9–3.7)
GLUCOSE SERPL-MCNC: 131 MG/DL (ref 65–99)
GLUCOSE UR QL STRIP: NEGATIVE
HCT VFR BLD AUTO: 45.8 % (ref 38.5–50)
HDLC SERPL-MCNC: 62 MG/DL
HGB BLD-MCNC: 15.4 G/DL (ref 13.2–17.1)
HGB UR QL STRIP: NEGATIVE
HYALINE CASTS #/AREA URNS LPF: NORMAL /LPF
KETONES UR QL STRIP: NEGATIVE
LDLC SERPL CALC-MCNC: 88 MG/DL (CALC)
LEUKOCYTE ESTERASE UR QL STRIP: NEGATIVE
LYMPHOCYTES # BLD AUTO: 1204 CELLS/UL (ref 850–3900)
LYMPHOCYTES NFR BLD AUTO: 17.7 %
MCH RBC QN AUTO: 33.3 PG (ref 27–33)
MCHC RBC AUTO-ENTMCNC: 33.6 G/DL (ref 32–36)
MCV RBC AUTO: 99.1 FL (ref 80–100)
MICROALBUMIN UR-MCNC: 1.7 MG/DL
MONOCYTES # BLD AUTO: 469 CELLS/UL (ref 200–950)
MONOCYTES NFR BLD AUTO: 6.9 %
NEUTROPHILS # BLD AUTO: 4984 CELLS/UL (ref 1500–7800)
NEUTROPHILS NFR BLD AUTO: 73.3 %
NITRITE UR QL STRIP: NEGATIVE
NONHDLC SERPL-MCNC: 103 MG/DL (CALC)
PH UR STRIP: 7 [PH] (ref 5–8)
PLATELET # BLD AUTO: 278 THOUSAND/UL (ref 140–400)
PMV BLD REES-ECKER: 9.8 FL (ref 7.5–12.5)
POTASSIUM SERPL-SCNC: 5.2 MMOL/L (ref 3.5–5.3)
PROT SERPL-MCNC: 7.1 G/DL (ref 6.1–8.1)
PROT UR QL STRIP: NEGATIVE
PSA SERPL-MCNC: 3.91 NG/ML
RBC # BLD AUTO: 4.62 MILLION/UL (ref 4.2–5.8)
RBC #/AREA URNS HPF: NORMAL /HPF
SERVICE CMNT-IMP: NORMAL
SODIUM SERPL-SCNC: 139 MMOL/L (ref 135–146)
SP GR UR STRIP: 1.01 (ref 1–1.03)
SQUAMOUS #/AREA URNS HPF: NORMAL /HPF
TRIGL SERPL-MCNC: 67 MG/DL
TSH SERPL-ACNC: 0.72 MIU/L (ref 0.4–4.5)
WBC # BLD AUTO: 6.8 THOUSAND/UL (ref 3.8–10.8)
WBC #/AREA URNS HPF: NORMAL /HPF

## 2022-07-27 PROCEDURE — 99213 OFFICE O/P EST LOW 20 MIN: CPT | Mod: S$GLB,,, | Performed by: FAMILY MEDICINE

## 2022-07-27 PROCEDURE — 99213 PR OFFICE/OUTPT VISIT, EST, LEVL III, 20-29 MIN: ICD-10-PCS | Mod: S$GLB,,, | Performed by: FAMILY MEDICINE

## 2022-07-27 NOTE — PATIENT INSTRUCTIONS
Papi Hernández MD  [address] [facility] [address]  Phone: [phone]  Fax: [phone]    [facility]  [address] [address]  Phone: [phone]  Fax: [phone]

## 2022-07-27 NOTE — PROGRESS NOTES
SUBJECTIVE:    Patient ID: Watson Mcgee is a 81 y.o. male.    Chief Complaint: Follow-up    Pt here tot checkup on acute and chronic problems    Pt c/o ear pain on the right. Pt was here in may with similar issues. Ear was impacted, and ear was attempted to be washed. Was unable to clean completely due to pain. Was instructed to go home and put debrox.    At times it feels like his ear is pulsing, hears his heartbeat in his ear, which is clear today.     Had labs done: fBS 131, GFR 87, TSH 0.72    Last A1c 5.6%.     Wound on his leg has resolved.       No visits with results within 6 Month(s) from this visit.   Latest known visit with results is:   Office Visit on 01/27/2022   Component Date Value Ref Range Status    Glucose 07/26/2022 131 (A) 65 - 99 mg/dL Final    BUN 07/26/2022 11  7 - 25 mg/dL Final    Creatinine 07/26/2022 0.84  0.70 - 1.22 mg/dL Final    EGFR 07/26/2022 87  > OR = 60 mL/min/1.73m2 Final    BUN/Creatinine Ratio 07/26/2022 NOT APPLICABLE  6 - 22 (calc) Final    Sodium 07/26/2022 139  135 - 146 mmol/L Final    Potassium 07/26/2022 5.2  3.5 - 5.3 mmol/L Final    Chloride 07/26/2022 102  98 - 110 mmol/L Final    CO2 07/26/2022 27  20 - 32 mmol/L Final    Calcium 07/26/2022 9.8  8.6 - 10.3 mg/dL Final    Total Protein 07/26/2022 7.1  6.1 - 8.1 g/dL Final    Albumin 07/26/2022 4.3  3.6 - 5.1 g/dL Final    Globulin, Total 07/26/2022 2.8  1.9 - 3.7 g/dL (calc) Final    Albumin/Globulin Ratio 07/26/2022 1.5  1.0 - 2.5 (calc) Final    Total Bilirubin 07/26/2022 0.8  0.2 - 1.2 mg/dL Final    Alkaline Phosphatase 07/26/2022 72  35 - 144 U/L Final    AST 07/26/2022 15  10 - 35 U/L Final    ALT 07/26/2022 16  9 - 46 U/L Final    Cholesterol 07/26/2022 165  <200 mg/dL Final    HDL 07/26/2022 62  > OR = 40 mg/dL Final    Triglycerides 07/26/2022 67  <150 mg/dL Final    LDL Cholesterol 07/26/2022 88  mg/dL (calc) Final    HDL/Cholesterol Ratio 07/26/2022 2.7  <5.0 (calc) Final     Non HDL Chol. (LDL+VLDL) 07/26/2022 103  <130 mg/dL (calc) Final    Creatinine, Urine 07/26/2022 128  20 - 320 mg/dL Final    Microalb, Ur 07/26/2022 1.7  See Note: mg/dL Final    Microalb/Creat Ratio 07/26/2022 13  <30 mcg/mg creat Final    Color, UA 07/26/2022 DARK YELLOW  YELLOW Final    Appearance, UA 07/26/2022 CLEAR  CLEAR Final    Specific Gravity, UA 07/26/2022 1.015  1.001 - 1.035 Final    pH, UA 07/26/2022 7.0  5.0 - 8.0 Final    Glucose, UA 07/26/2022 NEGATIVE  NEGATIVE Final    Bilirubin, UA 07/26/2022 NEGATIVE  NEGATIVE Final    Ketones, UA 07/26/2022 NEGATIVE  NEGATIVE Final    Occult Blood UA 07/26/2022 NEGATIVE  NEGATIVE Final    Protein, UA 07/26/2022 NEGATIVE  NEGATIVE Final    Nitrite, UA 07/26/2022 NEGATIVE  NEGATIVE Final    Leukocytes, UA 07/26/2022 NEGATIVE  NEGATIVE Final    WBC Casts, UA 07/26/2022 NONE SEEN  < OR = 5 /HPF Final    RBC Casts, UA 07/26/2022 NONE SEEN  < OR = 2 /HPF Final    Squam Epithel, UA 07/26/2022 NONE SEEN  < OR = 5 /HPF Final    Bacteria, UA 07/26/2022 NONE SEEN  NONE SEEN /HPF Final    Hyaline Casts, UA 07/26/2022 NONE SEEN  NONE SEEN /LPF Final    Service Cmt: 07/26/2022    Final    Reflexive Urine Culture 07/26/2022    Final    TSH w/reflex to FT4 07/26/2022 0.72  0.40 - 4.50 mIU/L Final    WBC 07/26/2022 6.8  3.8 - 10.8 Thousand/uL Final    RBC 07/26/2022 4.62  4.20 - 5.80 Million/uL Final    Hemoglobin 07/26/2022 15.4  13.2 - 17.1 g/dL Final    Hematocrit 07/26/2022 45.8  38.5 - 50.0 % Final    MCV 07/26/2022 99.1  80.0 - 100.0 fL Final    MCH 07/26/2022 33.3 (A) 27.0 - 33.0 pg Final    MCHC 07/26/2022 33.6  32.0 - 36.0 g/dL Final    RDW 07/26/2022 11.8  11.0 - 15.0 % Final    Platelets 07/26/2022 278  140 - 400 Thousand/uL Final    MPV 07/26/2022 9.8  7.5 - 12.5 fL Final    Neutrophils, Abs 07/26/2022 4,984  1,500 - 7,800 cells/uL Final    Lymph # 07/26/2022 1,204  850 - 3,900 cells/uL Final    Mono # 07/26/2022 469  200 - 950  cells/uL Final    Eos # 07/26/2022 102  15 - 500 cells/uL Final    Baso # 07/26/2022 41  0 - 200 cells/uL Final    Neutrophils Relative 07/26/2022 73.3  % Final    Lymph % 07/26/2022 17.7  % Final    Mono % 07/26/2022 6.9  % Final    Eosinophil % 07/26/2022 1.5  % Final    Basophil % 07/26/2022 0.6  % Final    PROSTATE SPECIFIC ANTIGEN, SCR - Q* 07/26/2022 3.91  < OR = 4.00 ng/mL Final       Past Medical History:   Diagnosis Date    Allergy     Hyperlipidemia     Hypertension      Social History     Socioeconomic History    Marital status: Single   Tobacco Use    Smoking status: Never Smoker    Smokeless tobacco: Never Used   Substance and Sexual Activity    Alcohol use: Yes     Alcohol/week: 21.0 standard drinks     Types: 21 Glasses of wine per week     History reviewed. No pertinent surgical history.  History reviewed. No pertinent family history.    Review of patient's allergies indicates:   Allergen Reactions    Clindamycin hcl      Other reaction(s): Unknown    Penicillin g potassium      Other reaction(s): Unknown       Current Outpatient Medications:     amlodipine-benazepril 5-20 mg (LOTREL) 5-20 mg per capsule, TK 1 C PO D, Disp: , Rfl: 3    aspirin 81 MG Chew, 1 tablet, Disp: , Rfl:     atenolol (TENORMIN) 25 MG tablet, TK 1 T PO D, Disp: , Rfl: 7    blood sugar diagnostic Strp, To check BG one time daily, to use with insurance preferred meter, Disp: 100 each, Rfl: 0    blood-glucose meter kit, To check BG one time daily, to use with insurance preferred meter, Disp: 1 each, Rfl: 0    fluticasone propionate (FLONASE) 50 mcg/actuation nasal spray, 1 spray in each nostril, Disp: , Rfl:     lancets Misc, To check BG one time daily, to use with insurance preferred meter, Disp: 100 each, Rfl: 0    multivitamin-iron-folic acid Tab, 1 tablet, Disp: , Rfl:     mupirocin (BACTROBAN) 2 % ointment, Apply topically 2 (two) times daily., Disp: 22 g, Rfl: 0    pravastatin (PRAVACHOL) 40 MG  "tablet, TK 1 T PO  QHS, Disp: , Rfl: 3    rutin/hesp/bioflav/C/[id number] (BIOFLEX ORAL), Take 2 tablets by mouth once daily., Disp: , Rfl:     Review of Systems   Constitutional: Negative for appetite change, fatigue, fever and unexpected weight change.   HENT: Positive for ear pain.    Respiratory: Negative for cough, chest tightness, shortness of breath and wheezing.    Cardiovascular: Negative for chest pain and leg swelling.   Gastrointestinal: Negative for abdominal pain, constipation, nausea and vomiting.        -heartburn   Genitourinary: Negative for decreased urine volume, difficulty urinating, dysuria and frequency.   Musculoskeletal: Positive for arthralgias. Negative for back pain, myalgias and neck pain.   Skin: Negative for rash.   Neurological: Negative for dizziness, weakness, numbness and headaches.   Hematological: Does not bruise/bleed easily.   Psychiatric/Behavioral: Negative for behavioral problems, sleep disturbance and suicidal ideas. The patient is not nervous/anxious.           Objective:      Vitals:    07/27/22 1406   BP: 124/70   Pulse: 75   SpO2: 97%   Weight: 100.2 kg (221 lb)   Height: 6' 2" (1.88 m)     Physical Exam  Vitals reviewed.   Constitutional:       General: He is not in acute distress.     Appearance: Normal appearance. He is well-developed.   HENT:      Head: Normocephalic and atraumatic.      Right Ear: Tympanic membrane and ear canal normal.      Left Ear: Tympanic membrane and ear canal normal.   Neck:      Thyroid: No thyromegaly.   Cardiovascular:      Rate and Rhythm: Normal rate and regular rhythm.      Heart sounds: Normal heart sounds. No murmur heard.    No friction rub.   Pulmonary:      Effort: Pulmonary effort is normal.      Breath sounds: Normal breath sounds. No wheezing or rales.   Abdominal:      General: Bowel sounds are normal. There is no distension.      Palpations: Abdomen is soft.      Tenderness: There is no abdominal tenderness. "   Musculoskeletal:      Cervical back: Neck supple.   Lymphadenopathy:      Cervical: No cervical adenopathy.   Skin:     General: Skin is warm and dry.      Findings: No rash.   Neurological:      Mental Status: He is alert and oriented to person, place, and time.   Psychiatric:         Attention and Perception: He is attentive.         Speech: Speech normal.         Behavior: Behavior normal.         Thought Content: Thought content normal.         Judgment: Judgment normal.           Assessment:       1. Hearing loss of right ear, unspecified hearing loss type    2. IFG (impaired fasting glucose)         Plan:       Hearing loss of right ear, unspecified hearing loss type  Comments:  Symptomatic. Will refer to ENT for eval  Orders:  -     Ambulatory referral/consult to ENT; Future; Expected date: 08/03/2022  -     Ambulatory referral/consult to Audiology; Future; Expected date: 08/03/2022    IFG (impaired fasting glucose)  Comments:  Trending. fBS 130. Will continue to monitor.  Orders:  -     Comprehensive Metabolic Panel; Future; Expected date: 07/27/2022  -     Hemoglobin A1C; Future; Expected date: 07/27/2022    Other  Lab results discussed and reviewed with patient.  Labs and/or tests have been ordered for the evaluation/monitoring of acute/chronic conditions, to be done just before next visit.    Follow up in about 6 months (around 1/27/2023) for IFG.        7/27/2022 Kel Gongora

## 2023-01-06 LAB
ALBUMIN SERPL-MCNC: 4.5 G/DL (ref 3.6–5.1)
ALBUMIN/GLOB SERPL: 1.6 (CALC) (ref 1–2.5)
ALP SERPL-CCNC: 75 U/L (ref 35–144)
ALT SERPL-CCNC: 24 U/L (ref 9–46)
AST SERPL-CCNC: 17 U/L (ref 10–35)
BILIRUB SERPL-MCNC: 0.7 MG/DL (ref 0.2–1.2)
BUN SERPL-MCNC: 13 MG/DL (ref 7–25)
BUN/CREAT SERPL: ABNORMAL (CALC) (ref 6–22)
CALCIUM SERPL-MCNC: 9.7 MG/DL (ref 8.6–10.3)
CHLORIDE SERPL-SCNC: 101 MMOL/L (ref 98–110)
CO2 SERPL-SCNC: 29 MMOL/L (ref 20–32)
CREAT SERPL-MCNC: 0.81 MG/DL (ref 0.7–1.22)
EGFR: 87 ML/MIN/1.73M2
GLOBULIN SER CALC-MCNC: 2.9 G/DL (CALC) (ref 1.9–3.7)
GLUCOSE SERPL-MCNC: 123 MG/DL (ref 65–99)
HBA1C MFR BLD: 5.5 % OF TOTAL HGB
POTASSIUM SERPL-SCNC: 4.7 MMOL/L (ref 3.5–5.3)
PROT SERPL-MCNC: 7.4 G/DL (ref 6.1–8.1)
SODIUM SERPL-SCNC: 138 MMOL/L (ref 135–146)

## 2023-01-30 ENCOUNTER — OFFICE VISIT (OUTPATIENT)
Dept: FAMILY MEDICINE | Facility: CLINIC | Age: 83
End: 2023-01-30
Payer: MEDICARE

## 2023-01-30 VITALS
OXYGEN SATURATION: 99 % | WEIGHT: 224.19 LBS | BODY MASS INDEX: 28.77 KG/M2 | HEIGHT: 74 IN | SYSTOLIC BLOOD PRESSURE: 122 MMHG | DIASTOLIC BLOOD PRESSURE: 68 MMHG | HEART RATE: 86 BPM

## 2023-01-30 DIAGNOSIS — Z13.29 SCREENING FOR ENDOCRINE DISORDER: ICD-10-CM

## 2023-01-30 DIAGNOSIS — R73.01 IFG (IMPAIRED FASTING GLUCOSE): Primary | ICD-10-CM

## 2023-01-30 DIAGNOSIS — I10 ESSENTIAL HYPERTENSION: ICD-10-CM

## 2023-01-30 DIAGNOSIS — Z13.6 SCREENING FOR ISCHEMIC HEART DISEASE (IHD): ICD-10-CM

## 2023-01-30 DIAGNOSIS — Z13.89 SCREENING FOR BLOOD OR PROTEIN IN URINE: ICD-10-CM

## 2023-01-30 DIAGNOSIS — Z79.899 LONG-TERM USE OF HIGH-RISK MEDICATION: ICD-10-CM

## 2023-01-30 PROCEDURE — 99213 OFFICE O/P EST LOW 20 MIN: CPT | Mod: S$GLB,,, | Performed by: FAMILY MEDICINE

## 2023-01-30 PROCEDURE — 99213 PR OFFICE/OUTPT VISIT, EST, LEVL III, 20-29 MIN: ICD-10-PCS | Mod: S$GLB,,, | Performed by: FAMILY MEDICINE

## 2023-01-30 NOTE — PROGRESS NOTES
SUBJECTIVE:    Patient ID: Watson Mcgee is a 82 y.o. male.    Chief Complaint: Follow-up (Meds confirmed verbally/pna&flu denied/ eye exam to be scheduled//dp)    Pt here tot checkup on acute and chronic problems      Right ear is doing ok. Still having trouble keeping it from being impacted.     At times it feels like his ear is pulsing, hears his heartbeat in his ear, which is clear today.     Had labs done: fBS 123, A1c 5.5%, GFR 87.([place], her)    Has trouble with knees, takes bioflex.    No other complaints brought up        No visits with results within 6 Month(s) from this visit.   Latest known visit with results is:   Office Visit on 07/27/2022   Component Date Value Ref Range Status    Glucose 01/05/2023 123 (H)  65 - 99 mg/dL Final    BUN 01/05/2023 13  7 - 25 mg/dL Final    Creatinine 01/05/2023 0.81  0.70 - 1.22 mg/dL Final    eGFR 01/05/2023 87  > OR = 60 mL/min/1.73m2 Final    BUN/Creatinine Ratio 01/05/2023 NOT APPLICABLE  6 - 22 (calc) Final    Sodium 01/05/2023 138  135 - 146 mmol/L Final    Potassium 01/05/2023 4.7  3.5 - 5.3 mmol/L Final    Chloride 01/05/2023 101  98 - 110 mmol/L Final    CO2 01/05/2023 29  20 - 32 mmol/L Final    Calcium 01/05/2023 9.7  8.6 - 10.3 mg/dL Final    Total Protein 01/05/2023 7.4  6.1 - 8.1 g/dL Final    Albumin 01/05/2023 4.5  3.6 - 5.1 g/dL Final    Globulin, Total 01/05/2023 2.9  1.9 - 3.7 g/dL (calc) Final    Albumin/Globulin Ratio 01/05/2023 1.6  1.0 - 2.5 (calc) Final    Total Bilirubin 01/05/2023 0.7  0.2 - 1.2 mg/dL Final    Alkaline Phosphatase 01/05/2023 75  35 - 144 U/L Final    AST 01/05/2023 17  10 - 35 U/L Final    ALT 01/05/2023 24  9 - 46 U/L Final    Hemoglobin A1C 01/05/2023 5.5  <5.7 % of total Hgb Final       Past Medical History:   Diagnosis Date    Allergy     Hyperlipidemia     Hypertension      Social History     Socioeconomic History    Marital status: Single   Tobacco Use    Smoking status: Never    Smokeless tobacco: Never    Substance and Sexual Activity    Alcohol use: Yes     Alcohol/week: 21.0 standard drinks     Types: 21 Glasses of wine per week     History reviewed. No pertinent surgical history.  History reviewed. No pertinent family history.    Review of patient's allergies indicates:   Allergen Reactions    Clindamycin hcl      Other reaction(s): Unknown    Penicillin g potassium      Other reaction(s): Unknown       Current Outpatient Medications:     amlodipine-benazepril 5-20 mg (LOTREL) 5-20 mg per capsule, TK 1 C PO D, Disp: , Rfl: 3    aspirin 81 MG Chew, 1 tablet, Disp: , Rfl:     atenolol (TENORMIN) 25 MG tablet, TK 1 T PO D, Disp: , Rfl: 7    blood sugar diagnostic Strp, To check BG one time daily, to use with insurance preferred meter, Disp: 100 each, Rfl: 0    blood-glucose meter kit, To check BG one time daily, to use with insurance preferred meter, Disp: 1 each, Rfl: 0    fluticasone propionate (FLONASE) 50 mcg/actuation nasal spray, 1 spray in each nostril, Disp: , Rfl:     lancets Misc, To check BG one time daily, to use with insurance preferred meter, Disp: 100 each, Rfl: 0    multivitamin-iron-folic acid Tab, 1 tablet, Disp: , Rfl:     mupirocin (BACTROBAN) 2 % ointment, Apply topically 2 (two) times daily., Disp: 22 g, Rfl: 0    pravastatin (PRAVACHOL) 40 MG tablet, TK 1 T PO  QHS, Disp: , Rfl: 3    rutin/hesp/bioflav/C/[id number] (BIOFLEX ORAL), Take 2 tablets by mouth once daily., Disp: , Rfl:     Review of Systems   Constitutional:  Negative for appetite change and unexpected weight change.   HENT:  Negative for ear pain.    Respiratory:  Negative for chest tightness, shortness of breath and wheezing.    Cardiovascular:  Negative for leg swelling.   Gastrointestinal:  Negative for constipation.        -heartburn   Genitourinary:  Negative for decreased urine volume, difficulty urinating, dysuria and frequency.   Musculoskeletal:  Negative for back pain.   Neurological:  Negative for dizziness.  "  Hematological:  Does not bruise/bleed easily.   Psychiatric/Behavioral:  Negative for behavioral problems, sleep disturbance and suicidal ideas. The patient is not nervous/anxious.         Objective:      Vitals:    01/30/23 1454   BP: 122/68   Pulse: 86   SpO2: 99%   Weight: 101.7 kg (224 lb 3.2 oz)   Height: 6' 2" (1.88 m)     Wt Readings from Last 3 Encounters:   01/30/23 101.7 kg (224 lb 3.2 oz)   07/27/22 100.2 kg (221 lb)   05/16/22 101.2 kg (223 lb)       Physical Exam  Vitals reviewed.   Constitutional:       General: He is not in acute distress.     Appearance: Normal appearance. He is well-developed.   HENT:      Head: Normocephalic and atraumatic.      Right Ear: Tympanic membrane and ear canal normal.      Left Ear: Tympanic membrane and ear canal normal.   Neck:      Thyroid: No thyromegaly.   Cardiovascular:      Rate and Rhythm: Normal rate and regular rhythm.      Heart sounds: Normal heart sounds. No murmur heard.    No friction rub.   Pulmonary:      Effort: Pulmonary effort is normal.      Breath sounds: Normal breath sounds. No wheezing or rales.   Abdominal:      General: Bowel sounds are normal. There is no distension.      Palpations: Abdomen is soft.      Tenderness: There is no abdominal tenderness.   Musculoskeletal:      Cervical back: Neck supple.   Lymphadenopathy:      Cervical: No cervical adenopathy.   Skin:     General: Skin is warm and dry.      Findings: No rash.   Neurological:      Mental Status: He is alert and oriented to person, place, and time.   Psychiatric:         Attention and Perception: He is attentive.         Speech: Speech normal.         Behavior: Behavior normal.         Thought Content: Thought content normal.         Judgment: Judgment normal.         Assessment:       1. IFG (impaired fasting glucose)    2. Essential hypertension    3. Long-term use of high-risk medication    4. Screening for blood or protein in urine    5. Screening for ischemic heart disease " (IHD)    6. Screening for endocrine disorder         Plan:       IFG (impaired fasting glucose)  Comments:  Trending. A1c 5.5%, fBS 123. Will continue to monitor    Essential hypertension  Comments:  Controlled. Will continue to monitor BP  on current medication regimen  Orders:  -     Comprehensive Metabolic Panel; Future; Expected date: 01/30/2023  -     Lipid Panel; Future; Expected date: 01/30/2023  -     Urinalysis; Future; Expected date: 01/30/2023  -     TSH w/reflex to FT4; Future; Expected date: 01/30/2023  -     Urinalysis, Reflex to Urine Culture Urine, Clean Catch; Future; Expected date: 01/30/2023  -     CBC Auto Differential; Future; Expected date: 01/30/2023  -     Hemoglobin A1C; Future; Expected date: 01/30/2023    Long-term use of high-risk medication  -     Comprehensive Metabolic Panel; Future; Expected date: 01/30/2023  -     Lipid Panel; Future; Expected date: 01/30/2023  -     Urinalysis; Future; Expected date: 01/30/2023  -     TSH w/reflex to FT4; Future; Expected date: 01/30/2023  -     Urinalysis, Reflex to Urine Culture Urine, Clean Catch; Future; Expected date: 01/30/2023  -     CBC Auto Differential; Future; Expected date: 01/30/2023  -     Hemoglobin A1C; Future; Expected date: 01/30/2023    Screening for blood or protein in urine  -     Urinalysis; Future; Expected date: 01/30/2023  -     Urinalysis, Reflex to Urine Culture Urine, Clean Catch; Future; Expected date: 01/30/2023    Screening for ischemic heart disease (IHD)  -     Comprehensive Metabolic Panel; Future; Expected date: 01/30/2023  -     Lipid Panel; Future; Expected date: 01/30/2023    Screening for endocrine disorder  -     TSH w/reflex to FT4; Future; Expected date: 01/30/2023  -     Hemoglobin A1C; Future; Expected date: 01/30/2023      Other  Lab results discussed and reviewed with patient.  Labs and/or tests have been ordered for the evaluation/monitoring of acute/chronic conditions, to be done just before next  visit.    Follow up in about 6 months (around 7/30/2023) for HTN, IFG.        1/30/2023 Kel Gongora

## 2023-06-28 ENCOUNTER — TELEPHONE (OUTPATIENT)
Dept: FAMILY MEDICINE | Facility: CLINIC | Age: 83
End: 2023-06-28

## 2023-07-11 LAB
ALBUMIN SERPL-MCNC: 4.6 G/DL (ref 3.6–5.1)
ALBUMIN/GLOB SERPL: 1.6 (CALC) (ref 1–2.5)
ALP SERPL-CCNC: 89 U/L (ref 35–144)
ALT SERPL-CCNC: 17 U/L (ref 9–46)
APPEARANCE UR: CLEAR
AST SERPL-CCNC: 15 U/L (ref 10–35)
BACTERIA #/AREA URNS HPF: ABNORMAL /HPF
BACTERIA UR CULT: ABNORMAL
BASOPHILS # BLD AUTO: 52 CELLS/UL (ref 0–200)
BASOPHILS NFR BLD AUTO: 0.6 %
BILIRUB SERPL-MCNC: 1.1 MG/DL (ref 0.2–1.2)
BILIRUB UR QL STRIP: NEGATIVE
BUN SERPL-MCNC: 16 MG/DL (ref 7–25)
BUN/CREAT SERPL: ABNORMAL (CALC) (ref 6–22)
CALCIUM SERPL-MCNC: 9.5 MG/DL (ref 8.6–10.3)
CAOX CRY #/AREA URNS HPF: ABNORMAL /HPF
CHLORIDE SERPL-SCNC: 102 MMOL/L (ref 98–110)
CHOLEST SERPL-MCNC: 160 MG/DL
CHOLEST/HDLC SERPL: 2.9 (CALC)
CO2 SERPL-SCNC: 25 MMOL/L (ref 20–32)
COLOR UR: ABNORMAL
CREAT SERPL-MCNC: 0.86 MG/DL (ref 0.7–1.22)
EGFR: 85 ML/MIN/1.73M2
EOSINOPHIL # BLD AUTO: 131 CELLS/UL (ref 15–500)
EOSINOPHIL NFR BLD AUTO: 1.5 %
ERYTHROCYTE [DISTWIDTH] IN BLOOD BY AUTOMATED COUNT: 11.7 % (ref 11–15)
GLOBULIN SER CALC-MCNC: 2.9 G/DL (CALC) (ref 1.9–3.7)
GLUCOSE SERPL-MCNC: 135 MG/DL (ref 65–99)
GLUCOSE UR QL STRIP: NEGATIVE
HBA1C MFR BLD: 5.4 % OF TOTAL HGB
HCT VFR BLD AUTO: 45.5 % (ref 38.5–50)
HDLC SERPL-MCNC: 55 MG/DL
HGB BLD-MCNC: 15.3 G/DL (ref 13.2–17.1)
HGB UR QL STRIP: NEGATIVE
HYALINE CASTS #/AREA URNS LPF: ABNORMAL /LPF
KETONES UR QL STRIP: ABNORMAL
LDLC SERPL CALC-MCNC: 87 MG/DL (CALC)
LEUKOCYTE ESTERASE UR QL STRIP: NEGATIVE
LYMPHOCYTES # BLD AUTO: 1418 CELLS/UL (ref 850–3900)
LYMPHOCYTES NFR BLD AUTO: 16.3 %
MCH RBC QN AUTO: 32.7 PG (ref 27–33)
MCHC RBC AUTO-ENTMCNC: 33.6 G/DL (ref 32–36)
MCV RBC AUTO: 97.2 FL (ref 80–100)
MONOCYTES # BLD AUTO: 661 CELLS/UL (ref 200–950)
MONOCYTES NFR BLD AUTO: 7.6 %
NEUTROPHILS # BLD AUTO: 6438 CELLS/UL (ref 1500–7800)
NEUTROPHILS NFR BLD AUTO: 74 %
NITRITE UR QL STRIP: NEGATIVE
NONHDLC SERPL-MCNC: 105 MG/DL (CALC)
PH UR STRIP: 5.5 [PH] (ref 5–8)
PLATELET # BLD AUTO: 320 THOUSAND/UL (ref 140–400)
PMV BLD REES-ECKER: 10.1 FL (ref 7.5–12.5)
POTASSIUM SERPL-SCNC: 5.1 MMOL/L (ref 3.5–5.3)
PROT SERPL-MCNC: 7.5 G/DL (ref 6.1–8.1)
PROT UR QL STRIP: ABNORMAL
RBC # BLD AUTO: 4.68 MILLION/UL (ref 4.2–5.8)
RBC #/AREA URNS HPF: ABNORMAL /HPF
SERVICE CMNT-IMP: ABNORMAL
SODIUM SERPL-SCNC: 138 MMOL/L (ref 135–146)
SP GR UR STRIP: 1.02 (ref 1–1.03)
SQUAMOUS #/AREA URNS HPF: ABNORMAL /HPF
TRIGL SERPL-MCNC: 85 MG/DL
TSH SERPL-ACNC: 0.48 MIU/L (ref 0.4–4.5)
WBC # BLD AUTO: 8.7 THOUSAND/UL (ref 3.8–10.8)
WBC #/AREA URNS HPF: ABNORMAL /HPF

## 2023-07-17 ENCOUNTER — OFFICE VISIT (OUTPATIENT)
Dept: FAMILY MEDICINE | Facility: CLINIC | Age: 83
End: 2023-07-17
Payer: MEDICARE

## 2023-07-17 VITALS
OXYGEN SATURATION: 97 % | HEART RATE: 66 BPM | WEIGHT: 220 LBS | BODY MASS INDEX: 28.23 KG/M2 | DIASTOLIC BLOOD PRESSURE: 70 MMHG | HEIGHT: 74 IN | SYSTOLIC BLOOD PRESSURE: 130 MMHG

## 2023-07-17 DIAGNOSIS — E78.2 MIXED HYPERLIPIDEMIA: ICD-10-CM

## 2023-07-17 DIAGNOSIS — E11.9 TYPE 2 DIABETES MELLITUS WITHOUT COMPLICATION, WITHOUT LONG-TERM CURRENT USE OF INSULIN: ICD-10-CM

## 2023-07-17 DIAGNOSIS — I10 ESSENTIAL HYPERTENSION: Primary | ICD-10-CM

## 2023-07-17 PROCEDURE — 99214 PR OFFICE/OUTPT VISIT, EST, LEVL IV, 30-39 MIN: ICD-10-PCS | Mod: S$GLB,,, | Performed by: FAMILY MEDICINE

## 2023-07-17 PROCEDURE — 99214 OFFICE O/P EST MOD 30 MIN: CPT | Mod: S$GLB,,, | Performed by: FAMILY MEDICINE

## 2023-07-17 NOTE — PROGRESS NOTES
SUBJECTIVE:    Patient ID: Watson Mcgee is a 82 y.o. male.    Chief Complaint: 6 month follow up (No bottles, labs done on 7/10/23)    Pt here to checkup on acute and chronic problems      Right ear is doing ok. Still having trouble keeping it from being impacted.       Has not been checking his blood sugar.     Had labs done: fBS 135, A1c 5.4%, GFR 85, TSH 0.48, LDL 87    His Cardiologist retired recently. He would get an EKG yearly. Last seen in Jan 2023.  Doesn't need anymore refills.     Has trouble with knees, takes bioflex.  It eliminates the sharp pain. Doesn't have to take any other otc meds like advil.    Has trouble with breathing. Does ride his bike on Saturday and Sunday. He also does work in his yard.    ------------------------------------------------------  Has not had eye exam in a few years.      Office Visit on 01/30/2023   Component Date Value Ref Range Status    Glucose 07/10/2023 135 (H)  65 - 99 mg/dL Final    BUN 07/10/2023 16  7 - 25 mg/dL Final    Creatinine 07/10/2023 0.86  0.70 - 1.22 mg/dL Final    eGFR 07/10/2023 85  > OR = 60 mL/min/1.73m2 Final    BUN/Creatinine Ratio 07/10/2023 NOT APPLICABLE  6 - 22 (calc) Final    Sodium 07/10/2023 138  135 - 146 mmol/L Final    Potassium 07/10/2023 5.1  3.5 - 5.3 mmol/L Final    Chloride 07/10/2023 102  98 - 110 mmol/L Final    CO2 07/10/2023 25  20 - 32 mmol/L Final    Calcium 07/10/2023 9.5  8.6 - 10.3 mg/dL Final    Total Protein 07/10/2023 7.5  6.1 - 8.1 g/dL Final    Albumin 07/10/2023 4.6  3.6 - 5.1 g/dL Final    Globulin, Total 07/10/2023 2.9  1.9 - 3.7 g/dL (calc) Final    Albumin/Globulin Ratio 07/10/2023 1.6  1.0 - 2.5 (calc) Final    Total Bilirubin 07/10/2023 1.1  0.2 - 1.2 mg/dL Final    Alkaline Phosphatase 07/10/2023 89  35 - 144 U/L Final    AST 07/10/2023 15  10 - 35 U/L Final    ALT 07/10/2023 17  9 - 46 U/L Final    Cholesterol 07/10/2023 160  <200 mg/dL Final    HDL 07/10/2023 55  > OR = 40 mg/dL Final    Triglycerides  07/10/2023 85  <150 mg/dL Final    LDL Cholesterol 07/10/2023 87  mg/dL (calc) Final    HDL/Cholesterol Ratio 07/10/2023 2.9  <5.0 (calc) Final    Non HDL Chol. (LDL+VLDL) 07/10/2023 105  <130 mg/dL (calc) Final    TSH w/reflex to FT4 07/10/2023 0.48  0.40 - 4.50 mIU/L Final    Color, UA 07/10/2023 DARK YELLOW  YELLOW Final    Appearance, UA 07/10/2023 CLEAR  CLEAR Final    Specific Gravity, UA 07/10/2023 1.025  1.001 - 1.035 Final    pH, UA 07/10/2023 5.5  5.0 - 8.0 Final    Glucose, UA 07/10/2023 NEGATIVE  NEGATIVE Final    Bilirubin, UA 07/10/2023 NEGATIVE  NEGATIVE Final    Ketones, UA 07/10/2023 TRACE (A)  NEGATIVE Final    Occult Blood UA 07/10/2023 NEGATIVE  NEGATIVE Final    Protein, UA 07/10/2023 TRACE (A)  NEGATIVE Final    Nitrite, UA 07/10/2023 NEGATIVE  NEGATIVE Final    Leukocytes, UA 07/10/2023 NEGATIVE  NEGATIVE Final    WBC Casts, UA 07/10/2023 NONE SEEN  < OR = 5 /HPF Final    RBC Casts, UA 07/10/2023 0-2  < OR = 2 /HPF Final    Squam Epithel, UA 07/10/2023 NONE SEEN  < OR = 5 /HPF Final    Bacteria, UA 07/10/2023 NONE SEEN  NONE SEEN /HPF Final    Ca Oxalate Iva, UA 07/10/2023 MODERATE (A)  NONE OR FEW /HPF Final    Hyaline Casts, UA 07/10/2023 NONE SEEN  NONE SEEN /LPF Final    Service Cmt: 07/10/2023    Final    Reflexive Urine Culture 07/10/2023    Final    WBC 07/10/2023 8.7  3.8 - 10.8 Thousand/uL Final    RBC 07/10/2023 4.68  4.20 - 5.80 Million/uL Final    Hemoglobin 07/10/2023 15.3  13.2 - 17.1 g/dL Final    Hematocrit 07/10/2023 45.5  38.5 - 50.0 % Final    MCV 07/10/2023 97.2  80.0 - 100.0 fL Final    MCH 07/10/2023 32.7  27.0 - 33.0 pg Final    MCHC 07/10/2023 33.6  32.0 - 36.0 g/dL Final    RDW 07/10/2023 11.7  11.0 - 15.0 % Final    Platelets 07/10/2023 320  140 - 400 Thousand/uL Final    MPV 07/10/2023 10.1  7.5 - 12.5 fL Final    Neutrophils, Abs 07/10/2023 6,438  1,500 - 7,800 cells/uL Final    Lymph # 07/10/2023 1,418  850 - 3,900 cells/uL Final    Mono # 07/10/2023 661  200 -  950 cells/uL Final    Eos # 07/10/2023 131  15 - 500 cells/uL Final    Baso # 07/10/2023 52  0 - 200 cells/uL Final    Neutrophils Relative 07/10/2023 74  % Final    Lymph % 07/10/2023 16.3  % Final    Mono % 07/10/2023 7.6  % Final    Eosinophil % 07/10/2023 1.5  % Final    Basophil % 07/10/2023 0.6  % Final    Hemoglobin A1C 07/10/2023 5.4  <5.7 % of total Hgb Final       Past Medical History:   Diagnosis Date    Allergy     Hyperlipidemia     Hypertension      Social History     Socioeconomic History    Marital status: Single   Tobacco Use    Smoking status: Never    Smokeless tobacco: Never   Substance and Sexual Activity    Alcohol use: Yes     Alcohol/week: 21.0 standard drinks     Types: 21 Glasses of wine per week     History reviewed. No pertinent surgical history.  History reviewed. No pertinent family history.    Review of patient's allergies indicates:   Allergen Reactions    Clindamycin hcl      Other reaction(s): Unknown    Penicillin g potassium      Other reaction(s): Unknown       Current Outpatient Medications:     amlodipine-benazepril 5-20 mg (LOTREL) 5-20 mg per capsule, TK 1 C PO D, Disp: , Rfl: 3    aspirin 81 MG Chew, 1 tablet, Disp: , Rfl:     atenolol (TENORMIN) 25 MG tablet, TK 1 T PO D, Disp: , Rfl: 7    blood sugar diagnostic Strp, To check BG one time daily, to use with insurance preferred meter, Disp: 100 each, Rfl: 0    blood-glucose meter kit, To check BG one time daily, to use with insurance preferred meter, Disp: 1 each, Rfl: 0    fluticasone propionate (FLONASE) 50 mcg/actuation nasal spray, 1 spray in each nostril, Disp: , Rfl:     lancets Misc, To check BG one time daily, to use with insurance preferred meter, Disp: 100 each, Rfl: 0    multivitamin-iron-folic acid Tab, 1 tablet, Disp: , Rfl:     mupirocin (BACTROBAN) 2 % ointment, Apply topically 2 (two) times daily., Disp: 22 g, Rfl: 0    pravastatin (PRAVACHOL) 40 MG tablet, TK 1 T PO  QHS, Disp: , Rfl: 3     "rutin/hesp/bioflav/C/[id number] (BIOFLEX ORAL), Take 2 tablets by mouth once daily., Disp: , Rfl:     Review of Systems   Constitutional:  Negative for appetite change and unexpected weight change.   HENT:  Negative for ear pain.    Respiratory:  Negative for chest tightness, shortness of breath and wheezing.    Cardiovascular:  Negative for leg swelling.   Gastrointestinal:  Negative for constipation.        -heartburn   Genitourinary:  Negative for decreased urine volume, difficulty urinating, dysuria and frequency.   Musculoskeletal:  Negative for back pain.   Neurological:  Negative for dizziness.   Hematological:  Does not bruise/bleed easily.   Psychiatric/Behavioral:  Negative for behavioral problems, sleep disturbance and suicidal ideas. The patient is not nervous/anxious.         Objective:      Vitals:    07/17/23 1010   BP: 130/70   Pulse: 66   SpO2: 97%   Weight: 99.8 kg (220 lb)   Height: 6' 2" (1.88 m)     Wt Readings from Last 3 Encounters:   07/17/23 99.8 kg (220 lb)   01/30/23 101.7 kg (224 lb 3.2 oz)   07/27/22 100.2 kg (221 lb)       Physical Exam  Vitals reviewed.   Constitutional:       General: He is not in acute distress.     Appearance: Normal appearance. He is well-developed.   HENT:      Head: Normocephalic and atraumatic.      Right Ear: Tympanic membrane and ear canal normal.      Left Ear: Tympanic membrane and ear canal normal.   Neck:      Thyroid: No thyromegaly.   Cardiovascular:      Rate and Rhythm: Normal rate and regular rhythm.      Heart sounds: Normal heart sounds. No murmur heard.    No friction rub.   Pulmonary:      Effort: Pulmonary effort is normal.      Breath sounds: Normal breath sounds. No wheezing or rales.   Abdominal:      General: Bowel sounds are normal. There is no distension.      Palpations: Abdomen is soft.      Tenderness: There is no abdominal tenderness.   Musculoskeletal:      Cervical back: Neck supple.   Lymphadenopathy:      Cervical: No cervical " adenopathy.   Skin:     General: Skin is warm and dry.      Findings: No rash.   Neurological:      Mental Status: He is alert and oriented to person, place, and time.   Psychiatric:         Attention and Perception: He is attentive.         Speech: Speech normal.         Behavior: Behavior normal.         Thought Content: Thought content normal.         Judgment: Judgment normal.         Assessment:       1. Essential hypertension    2. Type 2 diabetes mellitus without complication, without long-term current use of insulin    3. Mixed hyperlipidemia           Plan:       Essential hypertension  Comments:  Controlled. Will continue to monitor BP on current medication regimen  Orders:  -     Ambulatory referral/consult to Ophthalmology; Future; Expected date: 07/20/2023    Type 2 diabetes mellitus without complication, without long-term current use of insulin  Comments:  Controlled. A1c 5.4%. Encouraged to continue regular exercise.  Orders:  -     Ambulatory referral/consult to Ophthalmology; Future; Expected date: 07/20/2023    Mixed hyperlipidemia  Comments:  Optimal. LDL 87. To continue statin        Other  Lab results discussed and reviewed with patient.    Follow up in about 6 months (around 1/17/2024) for DM.        7/17/2023 Kel Gongora

## 2023-11-27 ENCOUNTER — TELEPHONE (OUTPATIENT)
Dept: FAMILY MEDICINE | Facility: CLINIC | Age: 83
End: 2023-11-27

## 2023-11-27 DIAGNOSIS — I10 ESSENTIAL HYPERTENSION: Primary | ICD-10-CM

## 2023-11-27 RX ORDER — AMLODIPINE AND BENAZEPRIL HYDROCHLORIDE 5; 20 MG/1; MG/1
1 CAPSULE ORAL DAILY
Qty: 90 CAPSULE | Refills: 3 | Status: SHIPPED | OUTPATIENT
Start: 2023-11-27

## 2023-11-27 NOTE — TELEPHONE ENCOUNTER
Spoke with pt. Apologized for inconvenience. Advised rx will be sent today. Pt voiced understanding.

## 2023-11-27 NOTE — TELEPHONE ENCOUNTER
This prescription has been approved and sent to   [id number]  [address] [address]  [address]  [place] [address]  Phone: [phone] Fax: [phone]

## 2023-11-27 NOTE — TELEPHONE ENCOUNTER
LMOR for a call back.    Pt no showed last appt.   Per  norco last filled 3/24/23 by a different Doctor.

## 2023-11-27 NOTE — TELEPHONE ENCOUNTER
----- Message from Freya Menjivar sent at 11/27/2023  7:03 AM CST -----  - 11/24-10:07-pt is calling to find out why his rx was not called in   [phone]

## 2023-11-27 NOTE — TELEPHONE ENCOUNTER
----- Message from Freya Menjivar sent at 11/27/2023  9:45 AM CST -----  Pt was in last Tuesday for a nurse visit for a refill on lortrel and it was never sent to the pharmacy. He would like to know why when he came in with his bottle   Demario lewis   [phone]

## 2023-11-27 NOTE — TELEPHONE ENCOUNTER
----- Message from Marion Ellison sent at 11/27/2023 12:33 PM CST -----  The patient is returning Bisi's call pt's # [phone] GH

## 2024-01-09 ENCOUNTER — TELEPHONE (OUTPATIENT)
Dept: FAMILY MEDICINE | Facility: CLINIC | Age: 84
End: 2024-01-09
Payer: MEDICARE

## 2024-01-09 DIAGNOSIS — Z79.899 LONG-TERM USE OF HIGH-RISK MEDICATION: Primary | ICD-10-CM

## 2024-01-09 DIAGNOSIS — I10 ESSENTIAL HYPERTENSION: ICD-10-CM

## 2024-01-09 DIAGNOSIS — R73.01 IFG (IMPAIRED FASTING GLUCOSE): ICD-10-CM

## 2024-01-09 DIAGNOSIS — E11.9 TYPE 2 DIABETES MELLITUS WITHOUT COMPLICATION, WITHOUT LONG-TERM CURRENT USE OF INSULIN: ICD-10-CM

## 2024-01-10 LAB
ALBUMIN SERPL-MCNC: 4.7 G/DL (ref 3.6–5.1)
ALBUMIN/CREAT UR: 10 MCG/MG CREAT
ALBUMIN/GLOB SERPL: 1.7 (CALC) (ref 1–2.5)
ALP SERPL-CCNC: 87 U/L (ref 35–144)
ALT SERPL-CCNC: 25 U/L (ref 9–46)
AST SERPL-CCNC: 20 U/L (ref 10–35)
BILIRUB SERPL-MCNC: 0.8 MG/DL (ref 0.2–1.2)
BUN SERPL-MCNC: 11 MG/DL (ref 7–25)
BUN/CREAT SERPL: ABNORMAL (CALC) (ref 6–22)
CALCIUM SERPL-MCNC: 9.9 MG/DL (ref 8.6–10.3)
CHLORIDE SERPL-SCNC: 102 MMOL/L (ref 98–110)
CHOLEST SERPL-MCNC: 170 MG/DL
CHOLEST/HDLC SERPL: 2.5 (CALC)
CO2 SERPL-SCNC: 26 MMOL/L (ref 20–32)
CREAT SERPL-MCNC: 0.79 MG/DL (ref 0.7–1.22)
CREAT UR-MCNC: 128 MG/DL (ref 20–320)
EGFR: 87 ML/MIN/1.73M2
GLOBULIN SER CALC-MCNC: 2.8 G/DL (CALC) (ref 1.9–3.7)
GLUCOSE SERPL-MCNC: 121 MG/DL (ref 65–99)
HBA1C MFR BLD: 6 % OF TOTAL HGB
HDLC SERPL-MCNC: 69 MG/DL
LDLC SERPL CALC-MCNC: 86 MG/DL (CALC)
MICROALBUMIN UR-MCNC: 1.3 MG/DL
NONHDLC SERPL-MCNC: 101 MG/DL (CALC)
POTASSIUM SERPL-SCNC: 5 MMOL/L (ref 3.5–5.3)
PROT SERPL-MCNC: 7.5 G/DL (ref 6.1–8.1)
SODIUM SERPL-SCNC: 139 MMOL/L (ref 135–146)
TRIGL SERPL-MCNC: 63 MG/DL

## 2024-01-18 ENCOUNTER — OFFICE VISIT (OUTPATIENT)
Dept: FAMILY MEDICINE | Facility: CLINIC | Age: 84
End: 2024-01-18
Payer: MEDICARE

## 2024-01-18 VITALS
SYSTOLIC BLOOD PRESSURE: 136 MMHG | DIASTOLIC BLOOD PRESSURE: 64 MMHG | OXYGEN SATURATION: 98 % | BODY MASS INDEX: 28.11 KG/M2 | HEIGHT: 74 IN | WEIGHT: 219 LBS | HEART RATE: 90 BPM

## 2024-01-18 DIAGNOSIS — M17.10 ARTHRITIS OF KNEE: ICD-10-CM

## 2024-01-18 DIAGNOSIS — Z79.899 LONG-TERM USE OF HIGH-RISK MEDICATION: ICD-10-CM

## 2024-01-18 DIAGNOSIS — E11.9 TYPE 2 DIABETES MELLITUS WITHOUT COMPLICATION, WITHOUT LONG-TERM CURRENT USE OF INSULIN: ICD-10-CM

## 2024-01-18 DIAGNOSIS — I10 ESSENTIAL HYPERTENSION: Primary | ICD-10-CM

## 2024-01-18 DIAGNOSIS — E78.2 MIXED HYPERLIPIDEMIA: ICD-10-CM

## 2024-01-18 PROCEDURE — 99214 OFFICE O/P EST MOD 30 MIN: CPT | Mod: S$GLB,,, | Performed by: FAMILY MEDICINE

## 2024-01-18 RX ORDER — ATENOLOL 25 MG/1
TABLET ORAL
Qty: 90 TABLET | Refills: 3 | Status: SHIPPED | OUTPATIENT
Start: 2024-01-18

## 2024-01-18 NOTE — PROGRESS NOTES
SUBJECTIVE:    Patient ID: Watson Mcgee is a 83 y.o. male.    Chief Complaint: Follow-up (6 mo f/u//brought med bottles//complains of arthritis in both knees//tc)    Pt here to checkup on acute and chronic problems      Right ear is doing ok. Still having trouble keeping it from being impacted.     Had labs done: fBS 121, A1c 6.0%, LDL 86,         Has trouble with knees, takes bioflex.  They hurt a little every time he stands up.  Doesn't have to take any other otc meds like advil and topical creams.     Has nasal congestion at night. Not riding his bike right now.     --------------------------------------------------------  Taking AREDs macular degeneration. ([place])        Telephone on 01/09/2024   Component Date Value Ref Range Status    Hemoglobin A1C 01/09/2024 6.0 (H)  <5.7 % of total Hgb Final    Creatinine, Urine 01/09/2024 128  20 - 320 mg/dL Final    Microalb, Ur 01/09/2024 1.3  See Note: mg/dL Final    Microalb/Creat Ratio 01/09/2024 10  <30 mcg/mg creat Final    Glucose 01/09/2024 121 (H)  65 - 99 mg/dL Final    BUN 01/09/2024 11  7 - 25 mg/dL Final    Creatinine 01/09/2024 0.79  0.70 - 1.22 mg/dL Final    eGFR 01/09/2024 87  > OR = 60 mL/min/1.73m2 Final    BUN/Creatinine Ratio 01/09/2024 SEE NOTE:  6 - 22 (calc) Final    Sodium 01/09/2024 139  135 - 146 mmol/L Final    Potassium 01/09/2024 5.0  3.5 - 5.3 mmol/L Final    Chloride 01/09/2024 102  98 - 110 mmol/L Final    CO2 01/09/2024 26  20 - 32 mmol/L Final    Calcium 01/09/2024 9.9  8.6 - 10.3 mg/dL Final    Total Protein 01/09/2024 7.5  6.1 - 8.1 g/dL Final    Albumin 01/09/2024 4.7  3.6 - 5.1 g/dL Final    Globulin, Total 01/09/2024 2.8  1.9 - 3.7 g/dL (calc) Final    Albumin/Globulin Ratio 01/09/2024 1.7  1.0 - 2.5 (calc) Final    Total Bilirubin 01/09/2024 0.8  0.2 - 1.2 mg/dL Final    Alkaline Phosphatase 01/09/2024 87  35 - 144 U/L Final    AST 01/09/2024 20  10 - 35 U/L Final    ALT 01/09/2024 25  9 - 46 U/L Final    Cholesterol  01/09/2024 170  <200 mg/dL Final    HDL 01/09/2024 69  > OR = 40 mg/dL Final    Triglycerides 01/09/2024 63  <150 mg/dL Final    LDL Cholesterol 01/09/2024 86  mg/dL (calc) Final    HDL/Cholesterol Ratio 01/09/2024 2.5  <5.0 (calc) Final    Non HDL Chol. (LDL+VLDL) 01/09/2024 101  <130 mg/dL (calc) Final       Past Medical History:   Diagnosis Date    Allergy     Hyperlipidemia     Hypertension      Social History     Socioeconomic History    Marital status: Single   Tobacco Use    Smoking status: Never    Smokeless tobacco: Never   Substance and Sexual Activity    Alcohol use: Yes     Alcohol/week: 21.0 standard drinks of alcohol     Types: 21 Glasses of wine per week     No past surgical history on file.  No family history on file.    Review of patient's allergies indicates:   Allergen Reactions    Clindamycin hcl      Other reaction(s): Unknown    Penicillin g potassium      Other reaction(s): Unknown       Current Outpatient Medications:     amlodipine-benazepril 5-20 mg (LOTREL) 5-20 mg per capsule, Take 1 capsule by mouth once daily., Disp: 90 capsule, Rfl: 3    aspirin 81 MG Chew, 1 tablet, Disp: , Rfl:     blood sugar diagnostic Strp, To check BG one time daily, to use with insurance preferred meter, Disp: 100 each, Rfl: 0    blood-glucose meter kit, To check BG one time daily, to use with insurance preferred meter, Disp: 1 each, Rfl: 0    fluticasone propionate (FLONASE) 50 mcg/actuation nasal spray, 1 spray in each nostril, Disp: , Rfl:     lancets Misc, To check BG one time daily, to use with insurance preferred meter, Disp: 100 each, Rfl: 0    multivitamin-iron-folic acid Tab, 1 tablet, Disp: , Rfl:     pravastatin (PRAVACHOL) 40 MG tablet, TK 1 T PO  QHS, Disp: , Rfl: 3    rutin/hesp/bioflav/C/pjrnop476 (BIOFLEX ORAL), Take 2 tablets by mouth once daily., Disp: , Rfl:     atenoloL (TENORMIN) 25 MG tablet, Take one tablet po daily, Disp: 90 tablet, Rfl: 3    mupirocin (BACTROBAN) 2 % ointment, Apply  "topically 2 (two) times daily. (Patient not taking: Reported on 1/18/2024), Disp: 22 g, Rfl: 0    Review of Systems   Constitutional:  Negative for appetite change and unexpected weight change.   HENT:  Negative for ear pain.    Respiratory:  Negative for chest tightness, shortness of breath and wheezing.    Cardiovascular:  Negative for leg swelling.   Gastrointestinal:  Negative for constipation.        -heartburn   Genitourinary:  Negative for decreased urine volume, difficulty urinating, dysuria and frequency.   Musculoskeletal:  Negative for back pain.   Neurological:  Negative for dizziness.   Hematological:  Does not bruise/bleed easily.   Psychiatric/Behavioral:  Negative for behavioral problems, sleep disturbance and suicidal ideas. The patient is not nervous/anxious.           Objective:      Vitals:    01/18/24 1446   BP: 136/64   Pulse: 90   SpO2: 98%   Weight: 99.3 kg (219 lb)   Height: 6' 2" (1.88 m)       Wt Readings from Last 3 Encounters:   01/18/24 99.3 kg (219 lb)   07/17/23 99.8 kg (220 lb)   01/30/23 101.7 kg (224 lb 3.2 oz)       Physical Exam  Vitals reviewed.   Constitutional:       General: He is not in acute distress.     Appearance: Normal appearance. He is well-developed.   HENT:      Head: Normocephalic and atraumatic.      Right Ear: Tympanic membrane and ear canal normal.      Left Ear: Tympanic membrane and ear canal normal.   Neck:      Thyroid: No thyromegaly.   Cardiovascular:      Rate and Rhythm: Normal rate and regular rhythm.      Heart sounds: Normal heart sounds. No murmur heard.     No friction rub.   Pulmonary:      Effort: Pulmonary effort is normal.      Breath sounds: Normal breath sounds. No wheezing or rales.   Abdominal:      General: Bowel sounds are normal. There is no distension.      Palpations: Abdomen is soft.      Tenderness: There is no abdominal tenderness.   Musculoskeletal:      Cervical back: Neck supple.   Lymphadenopathy:      Cervical: No cervical " adenopathy.   Skin:     General: Skin is warm and dry.      Findings: No rash.   Neurological:      Mental Status: He is alert and oriented to person, place, and time.   Psychiatric:         Attention and Perception: He is attentive.         Speech: Speech normal.         Behavior: Behavior normal.         Thought Content: Thought content normal.         Judgment: Judgment normal.           Assessment:       1. Essential hypertension    2. Type 2 diabetes mellitus without complication, without long-term current use of insulin    3. Mixed hyperlipidemia    4. Arthritis of knee    5. Long-term use of high-risk medication             Plan:       Essential hypertension  Comments:  Controlled. Will continue to monitor BP on current medication regimen  Orders:  -     atenoloL (TENORMIN) 25 MG tablet; Take one tablet po daily  Dispense: 90 tablet; Refill: 3  -     Cancel: EKG 12-lead; Future  -     Cancel: EKG 12-lead; Future  -     EKG 12-lead; Future    Type 2 diabetes mellitus without complication, without long-term current use of insulin  Comments:  Controlled. A1c 6.0%. To continue ADA diet, regular exercise. Will continue to monitor A1c.    Mixed hyperlipidemia  Comments:  Optimal. LDL 86. To continue statin    Arthritis of knee  Comments:  Chronic. Will continue to monitor symptoms    Long-term use of high-risk medication  -     Cancel: EKG 12-lead; Future  -     EKG 12-lead; Future  -     HEMOGLOBIN A1C; Future; Expected date: 01/18/2024  -     Comprehensive Metabolic Panel; Future; Expected date: 01/18/2024          Other  Lab results discussed and reviewed with patient.  Labs and/or tests have been ordered for the evaluation/monitoring of acute/chronic conditions, to be done just before next visit.    Follow up in about 6 months (around 7/18/2024) for IFG, HTN.        1/29/2024 Kel Gongora

## 2024-07-10 ENCOUNTER — TELEPHONE (OUTPATIENT)
Dept: FAMILY MEDICINE | Facility: CLINIC | Age: 84
End: 2024-07-10
Payer: MEDICARE

## 2024-07-13 NOTE — PROGRESS NOTES
SUBJECTIVE:    Patient ID: Watson Mcgee is a 83 y.o. male.    Chief Complaint: Follow-up (6 mo f/u//no med bottles//no complaints//tc)    Pt here to checkup on acute and chronic problems      Right ear is doing so-so. Still having trouble keeping it from being impacted.     Has had labs done: A1c 5.6%, fBS 124        Has trouble with knees, takes bioflex.  They hurt a little every time he stands up.  They are getting more iffy and so doesn't ride his bike anymore.     Has nasal congestion at night. Otherwise has been doing ok.     --------------------------------------------------------  Taking AREDs macular degeneration. ([place])        No visits with results within 6 Month(s) from this visit.   Latest known visit with results is:   Office Visit on 01/18/2024   Component Date Value Ref Range Status    Hemoglobin A1C 07/15/2024 5.6  <5.7 % of total Hgb Final    Glucose 07/15/2024 124  65 - 139 mg/dL Final    BUN 07/15/2024 19  7 - 25 mg/dL Final    Creatinine 07/15/2024 0.96  0.70 - 1.22 mg/dL Final    eGFR 07/15/2024 77  > OR = 60 mL/min/1.73m2 Final    BUN/Creatinine Ratio 07/15/2024 SEE NOTE:  6 - 22 (calc) Final    Sodium 07/15/2024 138  135 - 146 mmol/L Final    Potassium 07/15/2024 5.1  3.5 - 5.3 mmol/L Final    Chloride 07/15/2024 101  98 - 110 mmol/L Final    CO2 07/15/2024 29  20 - 32 mmol/L Final    Calcium 07/15/2024 9.5  8.6 - 10.3 mg/dL Final    Total Protein 07/15/2024 7.0  6.1 - 8.1 g/dL Final    Albumin 07/15/2024 4.4  3.6 - 5.1 g/dL Final    Globulin, Total 07/15/2024 2.6  1.9 - 3.7 g/dL (calc) Final    Albumin/Globulin Ratio 07/15/2024 1.7  1.0 - 2.5 (calc) Final    Total Bilirubin 07/15/2024 0.8  0.2 - 1.2 mg/dL Final    Alkaline Phosphatase 07/15/2024 83  35 - 144 U/L Final    AST 07/15/2024 13  10 - 35 U/L Final    ALT 07/15/2024 16  9 - 46 U/L Final       Past Medical History:   Diagnosis Date    Allergy     Hyperlipidemia     Hypertension      Social History     Socioeconomic History     Marital status: Single   Tobacco Use    Smoking status: Never    Smokeless tobacco: Never   Substance and Sexual Activity    Alcohol use: Yes     Alcohol/week: 21.0 standard drinks of alcohol     Types: 21 Glasses of wine per week     History reviewed. No pertinent surgical history.  No family history on file.    Review of patient's allergies indicates:   Allergen Reactions    Clindamycin hcl      Other reaction(s): Unknown    Penicillin g potassium      Other reaction(s): Unknown       Current Outpatient Medications:     amlodipine-benazepril 5-20 mg (LOTREL) 5-20 mg per capsule, Take 1 capsule by mouth once daily., Disp: 90 capsule, Rfl: 3    aspirin 81 MG Chew, 1 tablet, Disp: , Rfl:     atenoloL (TENORMIN) 25 MG tablet, Take one tablet po daily, Disp: 90 tablet, Rfl: 3    fluticasone propionate (FLONASE) 50 mcg/actuation nasal spray, 1 spray in each nostril, Disp: , Rfl:     multivitamin-iron-folic acid Tab, 1 tablet, Disp: , Rfl:     pravastatin (PRAVACHOL) 40 MG tablet, TK 1 T PO  QHS, Disp: , Rfl: 3    rutin/hesp/bioflav/C/xcfvem460 (BIOFLEX ORAL), Take 2 tablets by mouth once daily., Disp: , Rfl:     blood sugar diagnostic Strp, To check BG one time daily, to use with insurance preferred meter, Disp: 100 each, Rfl: 0    blood-glucose meter kit, To check BG one time daily, to use with insurance preferred meter, Disp: 1 each, Rfl: 0    lancets Misc, To check BG one time daily, to use with insurance preferred meter, Disp: 100 each, Rfl: 0    mupirocin (BACTROBAN) 2 % ointment, Apply topically 2 (two) times daily. (Patient not taking: Reported on 1/18/2024), Disp: 22 g, Rfl: 0    Review of Systems   Constitutional:  Negative for appetite change and unexpected weight change.   HENT:  Positive for hearing loss (right ear). Negative for ear pain.    Respiratory:  Negative for chest tightness, shortness of breath and wheezing.    Cardiovascular:  Negative for leg swelling.   Gastrointestinal:  Negative for  "constipation.        -heartburn   Genitourinary:  Negative for decreased urine volume, difficulty urinating, dysuria and frequency.   Musculoskeletal:  Negative for back pain.   Neurological:  Negative for dizziness.   Hematological:  Does not bruise/bleed easily.   Psychiatric/Behavioral:  Negative for behavioral problems, sleep disturbance and suicidal ideas. The patient is not nervous/anxious.           Objective:      Vitals:    07/18/24 1542   BP: 134/62   Pulse: 72   SpO2: 98%   Weight: 101.6 kg (224 lb)   Height: 6' 2" (1.88 m)         Wt Readings from Last 3 Encounters:   07/18/24 101.6 kg (224 lb)   01/18/24 99.3 kg (219 lb)   07/17/23 99.8 kg (220 lb)       Physical Exam  Vitals reviewed.   Constitutional:       General: He is not in acute distress.     Appearance: Normal appearance. He is well-developed.   HENT:      Head: Normocephalic and atraumatic.      Right Ear: Ear canal normal. There is impacted cerumen.      Left Ear: Tympanic membrane and ear canal normal. There is no impacted cerumen.   Neck:      Thyroid: No thyromegaly.   Cardiovascular:      Rate and Rhythm: Normal rate and regular rhythm.      Heart sounds: Normal heart sounds. No murmur heard.     No friction rub.   Pulmonary:      Effort: Pulmonary effort is normal.      Breath sounds: Normal breath sounds. No wheezing or rales.   Abdominal:      General: Bowel sounds are normal. There is no distension.      Palpations: Abdomen is soft.      Tenderness: There is no abdominal tenderness.   Musculoskeletal:      Cervical back: Neck supple.   Lymphadenopathy:      Cervical: No cervical adenopathy.   Skin:     General: Skin is warm and dry.      Findings: No rash.   Neurological:      Mental Status: He is alert and oriented to person, place, and time.   Psychiatric:         Attention and Perception: He is attentive.         Speech: Speech normal.         Behavior: Behavior normal.         Thought Content: Thought content normal.         " Judgment: Judgment normal.           Assessment:       1. IFG (impaired fasting glucose)    2. Essential hypertension    3. Impacted cerumen of right ear    4. Long-term use of high-risk medication    5. Encounter to discuss test results    6. Elevated blood sugar               Plan:       IFG (impaired fasting glucose)  Comments:  Trending. fBS 124, A1c 5.6%. Will continue to monitor BS and screen for DM  Orders:  -     TSH w/reflex to FT4; Future; Expected date: 07/18/2024  -     Urinalysis, Reflex to Urine Culture Urine, Clean Catch; Future; Expected date: 07/18/2024  -     CBC Auto Differential; Future; Expected date: 07/18/2024  -     Lipid Panel; Future; Expected date: 07/18/2024  -     Comprehensive Metabolic Panel; Future; Expected date: 07/18/2024  -     Microalbumin/Creatinine Ratio, Urine; Future; Expected date: 07/18/2024  -     Hemoglobin A1C; Future; Expected date: 07/18/2024    Essential hypertension  Comments:  Controlled. Will continue to monitor BP on current medication regimen  Orders:  -     TSH w/reflex to FT4; Future; Expected date: 07/18/2024  -     Urinalysis, Reflex to Urine Culture Urine, Clean Catch; Future; Expected date: 07/18/2024  -     CBC Auto Differential; Future; Expected date: 07/18/2024  -     Lipid Panel; Future; Expected date: 07/18/2024  -     Comprehensive Metabolic Panel; Future; Expected date: 07/18/2024  -     Microalbumin/Creatinine Ratio, Urine; Future; Expected date: 07/18/2024  -     Hemoglobin A1C; Future; Expected date: 07/18/2024    Impacted cerumen of right ear  Comments:  Resolved, with irrigation and curretage  Orders:  -     Ear Cerumen Removal    Long-term use of high-risk medication  -     TSH w/reflex to FT4; Future; Expected date: 07/18/2024  -     Urinalysis, Reflex to Urine Culture Urine, Clean Catch; Future; Expected date: 07/18/2024  -     CBC Auto Differential; Future; Expected date: 07/18/2024  -     Lipid Panel; Future; Expected date: 07/18/2024  -      Comprehensive Metabolic Panel; Future; Expected date: 07/18/2024  -     Microalbumin/Creatinine Ratio, Urine; Future; Expected date: 07/18/2024  -     Hemoglobin A1C; Future; Expected date: 07/18/2024    Encounter to discuss test results    Elevated blood sugar  -     Hemoglobin A1C; Future; Expected date: 07/18/2024        Other  Lab results discussed and reviewed with patient.  Labs and/or tests have been ordered for the evaluation/monitoring of acute/chronic conditions, to be done just before next visit.    Follow up in about 6 months (around 1/18/2025), or if symptoms worsen or fail to improve, for As scheduled.        7/18/2024 Kel Gongora

## 2024-07-18 ENCOUNTER — OFFICE VISIT (OUTPATIENT)
Dept: FAMILY MEDICINE | Facility: CLINIC | Age: 84
End: 2024-07-18
Payer: MEDICARE

## 2024-07-18 VITALS
WEIGHT: 224 LBS | HEIGHT: 74 IN | OXYGEN SATURATION: 98 % | DIASTOLIC BLOOD PRESSURE: 62 MMHG | BODY MASS INDEX: 28.75 KG/M2 | HEART RATE: 72 BPM | SYSTOLIC BLOOD PRESSURE: 134 MMHG

## 2024-07-18 DIAGNOSIS — R73.9 ELEVATED BLOOD SUGAR: ICD-10-CM

## 2024-07-18 DIAGNOSIS — H61.21 IMPACTED CERUMEN OF RIGHT EAR: ICD-10-CM

## 2024-07-18 DIAGNOSIS — Z71.2 ENCOUNTER TO DISCUSS TEST RESULTS: ICD-10-CM

## 2024-07-18 DIAGNOSIS — R73.01 IFG (IMPAIRED FASTING GLUCOSE): Primary | ICD-10-CM

## 2024-07-18 DIAGNOSIS — Z79.899 LONG-TERM USE OF HIGH-RISK MEDICATION: ICD-10-CM

## 2024-07-18 DIAGNOSIS — I10 ESSENTIAL HYPERTENSION: ICD-10-CM

## 2024-07-18 PROCEDURE — 99212 OFFICE O/P EST SF 10 MIN: CPT | Mod: 25,S$GLB,, | Performed by: FAMILY MEDICINE

## 2024-07-18 PROCEDURE — 69210 REMOVE IMPACTED EAR WAX UNI: CPT | Mod: S$GLB,,, | Performed by: FAMILY MEDICINE

## 2024-07-18 NOTE — PROCEDURES
"Ear Cerumen Removal    Date/Time: 7/18/2024 3:40 PM    Performed by: Kel Gongora MD  Authorized by: Kel Gongora MD    Time out: Immediately prior to procedure a "time out" was called to verify the correct patient, procedure, equipment, support staff and site/side marked as required.    Consent Done?:  Yes (Verbal)    Local anesthetic:  None  Location details:  Right ear  Procedure type: curette    Cerumen  Removal Results:  Cerumen completely removed  Patient tolerance:  Patient tolerated the procedure well with no immediate complications     This procedure was a medical necessity due to impacted cerumen.   "

## 2024-10-23 DIAGNOSIS — E78.2 MIXED HYPERLIPIDEMIA: Primary | ICD-10-CM

## 2024-10-23 RX ORDER — PRAVASTATIN SODIUM 40 MG/1
40 TABLET ORAL NIGHTLY
Qty: 90 TABLET | Refills: 1 | Status: SHIPPED | OUTPATIENT
Start: 2024-10-23

## 2024-10-23 NOTE — TELEPHONE ENCOUNTER
----- Message from Alma sent at 10/23/2024  9:23 AM CDT -----  Refill for pravastatin 40 mg, qty 90. [facility] on [address]. Pt #[phone]

## 2024-10-23 NOTE — TELEPHONE ENCOUNTER
prescription sent to     [facility] - [place] - [address] [facility] [address]  [address]  [place] [address]  Phone: [phone] Fax: [phone]

## 2024-10-23 NOTE — TELEPHONE ENCOUNTER
Spoke with patient states original rx was written by dr awad, cardiologist, who is now retired.  Patient states he does not see cardiology at this time.  Patient states this refill was discussed with dr quezada.  Rx pended, to MD for approval -DN

## 2024-10-31 LAB
LEFT EYE DM RETINOPATHY: NEGATIVE
RIGHT EYE DM RETINOPATHY: NEGATIVE

## 2024-11-01 ENCOUNTER — PATIENT OUTREACH (OUTPATIENT)
Dept: ADMINISTRATIVE | Facility: HOSPITAL | Age: 84
End: 2024-11-01
Payer: MEDICARE

## 2024-11-18 DIAGNOSIS — I10 ESSENTIAL HYPERTENSION: ICD-10-CM

## 2024-11-18 RX ORDER — AMLODIPINE AND BENAZEPRIL HYDROCHLORIDE 5; 20 MG/1; MG/1
1 CAPSULE ORAL DAILY
Qty: 90 CAPSULE | Refills: 3 | Status: SHIPPED | OUTPATIENT
Start: 2024-11-18

## 2024-11-18 NOTE — TELEPHONE ENCOUNTER
The patient's prescription has been approved and sent to [facility] [address] - [place] - [address]  [address] [facility]  [place] [address]  Phone: [phone] Fax: [phone]

## 2024-11-18 NOTE — TELEPHONE ENCOUNTER
----- Message from Dorene sent at 11/18/2024 12:32 PM CST -----  Refill on enalapril  [facility]   [phone]

## 2025-01-09 ENCOUNTER — TELEPHONE (OUTPATIENT)
Dept: FAMILY MEDICINE | Facility: CLINIC | Age: 85
End: 2025-01-09
Payer: MEDICARE

## 2025-01-09 DIAGNOSIS — I10 ESSENTIAL HYPERTENSION: ICD-10-CM

## 2025-01-09 RX ORDER — ATENOLOL 25 MG/1
TABLET ORAL
Qty: 90 TABLET | Refills: 3 | Status: SHIPPED | OUTPATIENT
Start: 2025-01-09

## 2025-01-09 NOTE — TELEPHONE ENCOUNTER
The patient's prescription has been approved and sent to [facility] - [place] - [address]  [address] [facility] [address] [address]  [place] [address]  Phone: [phone] Fax: [phone]

## 2025-01-09 NOTE — TELEPHONE ENCOUNTER
----- Message from Freya sent at 1/9/2025 11:57 AM CST -----  Pt needs refill on atenolol   [facility]   [phone]

## 2025-01-20 NOTE — PROGRESS NOTES
SUBJECTIVE:    Patient ID: Watson Mcgee is a 84 y.o. male.    Chief Complaint: Follow-up (6 mo f/u//no med bottles//declined flu vac//tc)    Pt here to checkup on acute and chronic problems      Right ear is doing ok. Still having trouble keeping it from being impacted.     Has had labs done: A1c 5.7%, fBS 136, GFR 84, LDL 85, TSH 0.65  Diabetes is doing ok. Not checking blood sugar. Not doing any exercise.     Has a rash on his leg that is crusty, puts a cream otc on it.     Has trouble with knees, takes bioflex.  They hurt a little and feel different every time he stands up. Has no had any falls. Not using a cane.     Has nasal congestion at night. Otherwise has been doing ok.     --------------------------------------------------------  Taking AREDs macular degeneration. (Mery)      Patient Outreach on 11/01/2024   Component Date Value Ref Range Status    Left Eye DM Retinopathy 10/31/2024 Negative   Final    Right Eye DM Retinopathy 10/31/2024 Negative   Final   Office Visit on 07/18/2024   Component Date Value Ref Range Status    TSH w/reflex to FT4 01/14/2025 0.65  0.40 - 4.50 mIU/L Final    Color, UA 01/14/2025 DARK YELLOW  YELLOW Final    Appearance, UA 01/14/2025 CLEAR  CLEAR Final    Specific Gravity, UA 01/14/2025 1.017  1.001 - 1.035 Final    pH, UA 01/14/2025 7.0  5.0 - 8.0 Final    Glucose, UA 01/14/2025 NEGATIVE  NEGATIVE Final    Bilirubin, UA 01/14/2025 NEGATIVE  NEGATIVE Final    Ketones, UA 01/14/2025 NEGATIVE  NEGATIVE Final    Occult Blood UA 01/14/2025 NEGATIVE  NEGATIVE Final    Protein, UA 01/14/2025 TRACE (A)  NEGATIVE Final    Nitrite, UA 01/14/2025 NEGATIVE  NEGATIVE Final    Leukocytes, UA 01/14/2025 NEGATIVE  NEGATIVE Final    WBC Casts, UA 01/14/2025 NONE SEEN  < OR = 5 /HPF Final    RBC Casts, UA 01/14/2025 NONE SEEN  < OR = 2 /HPF Final    Squam Epithel, UA 01/14/2025 NONE SEEN  < OR = 5 /HPF Final    Bacteria, UA 01/14/2025 NONE SEEN  NONE SEEN /HPF Final    Hyaline Casts,  UA 01/14/2025 NONE SEEN  NONE SEEN /LPF Final    Service Cmt: 01/14/2025 SEE COMMENT   Final    Comment: This urine was analyzed for the presence of WBC,   RBC, bacteria, casts, and other formed elements.   Only those elements seen were reported.            Reflexive Urine Culture 01/14/2025 SEE COMMENT   Final    NO CULTURE INDICATED    WBC 01/14/2025 9.4  3.8 - 10.8 Thousand/uL Final    RBC 01/14/2025 4.56  4.20 - 5.80 Million/uL Final    Hemoglobin 01/14/2025 14.9  13.2 - 17.1 g/dL Final    Hematocrit 01/14/2025 44.6  38.5 - 50.0 % Final    MCV 01/14/2025 97.8  80.0 - 100.0 fL Final    MCH 01/14/2025 32.7  27.0 - 33.0 pg Final    MCHC 01/14/2025 33.4  32.0 - 36.0 g/dL Final    Comment: For adults, a slight decrease in the calculated MCHC  value (in the range of 30 to 32 g/dL) is most likely  not clinically significant; however, it should be  interpreted with caution in correlation with other  red cell parameters and the patient's clinical  condition.      RDW 01/14/2025 11.4  11.0 - 15.0 % Final    Platelets 01/14/2025 348  140 - 400 Thousand/uL Final    MPV 01/14/2025 10.0  7.5 - 12.5 fL Final    Neutrophils, Abs 01/14/2025 6,683  1,500 - 7,800 cells/uL Final    Lymph # 01/14/2025 1,466  850 - 3,900 cells/uL Final    Mono # 01/14/2025 902  200 - 950 cells/uL Final    Eos # 01/14/2025 282  15 - 500 cells/uL Final    Baso # 01/14/2025 66  0 - 200 cells/uL Final    Neutrophils Relative 01/14/2025 71.1  % Final    Lymph % 01/14/2025 15.6  % Final    Mono % 01/14/2025 9.6  % Final    Eosinophil % 01/14/2025 3.0  % Final    Basophil % 01/14/2025 0.7  % Final    Cholesterol 01/14/2025 170  <200 mg/dL Final    HDL 01/14/2025 69  > OR = 40 mg/dL Final    Triglycerides 01/14/2025 73  <150 mg/dL Final    LDL Cholesterol 01/14/2025 85  mg/dL (calc) Final    Comment: Reference range: <100     Desirable range <100 mg/dL for primary prevention;    <70 mg/dL for patients with CHD or diabetic patients   with > or = 2 CHD risk  factors.     LDL-C is now calculated using the Nick   calculation, which is a validated novel method providing   better accuracy than the Friedewald equation in the   estimation of LDL-C.   Bernard BUNN et al. [place]. 2013;310(19): 7701-8761   (http://education.[employer]/faq/QZU817)      HDL/Cholesterol Ratio 01/14/2025 2.5  <5.0 (calc) Final    Non HDL Chol. (LDL+VLDL) 01/14/2025 101  <130 mg/dL (calc) Final    Comment: For patients with diabetes plus 1 major ASCVD risk   factor, treating to a non-HDL-C goal of <100 mg/dL   (LDL-C of <70 mg/dL) is considered a therapeutic   option.      Glucose 01/14/2025 136 (H)  65 - 99 mg/dL Final    Comment:               Fasting reference interval     For someone without known diabetes, a glucose  value >125 mg/dL indicates that they may have  diabetes and this should be confirmed with a  follow-up test.         BUN 01/14/2025 13  7 - 25 mg/dL Final    Creatinine 01/14/2025 0.86  0.70 - 1.22 mg/dL Final    eGFR 01/14/2025 84  > OR = 60 mL/min/1.73m2 Final    BUN/Creatinine Ratio 01/14/2025 SEE NOTE:  6 - 22 (calc) Final    Comment:    Not Reported: BUN and Creatinine are within     reference range.            Sodium 01/14/2025 138  135 - 146 mmol/L Final    Potassium 01/14/2025 5.0  3.5 - 5.3 mmol/L Final    Chloride 01/14/2025 102  98 - 110 mmol/L Final    CO2 01/14/2025 30  20 - 32 mmol/L Final    Calcium 01/14/2025 9.6  8.6 - 10.3 mg/dL Final    Total Protein 01/14/2025 7.4  6.1 - 8.1 g/dL Final    Albumin 01/14/2025 4.5  3.6 - 5.1 g/dL Final    Globulin, Total 01/14/2025 2.9  1.9 - 3.7 g/dL (calc) Final    Albumin/Globulin Ratio 01/14/2025 1.6  1.0 - 2.5 (calc) Final    Total Bilirubin 01/14/2025 0.8  0.2 - 1.2 mg/dL Final    Alkaline Phosphatase 01/14/2025 95  35 - 144 U/L Final    AST 01/14/2025 15  10 - 35 U/L Final    ALT 01/14/2025 19  9 - 46 U/L Final    Creatinine, Urine 01/14/2025 123  20 - 320 mg/dL Final    Microalb, Ur 01/14/2025 3.0  See Note:  mg/dL Final    Comment: Reference Range:    Reference Range  Not established      Microalb/Creat Ratio 01/14/2025 24  <30 mg/g creat Final    Comment:    The ADA defines abnormalities in albumin  excretion as follows:     Albuminuria Category        Result (mg/g creatinine)     Normal to Mildly increased   <30  Moderately increased            Severely increased           > OR = 300     The ADA recommends that at least two of three  specimens collected within a 3-6 month period be  abnormal before considering a patient to be  within a diagnostic category.      Hemoglobin A1C 01/14/2025 5.7 (H)  <5.7 % of total Hgb Final    Comment: For someone without known diabetes, a hemoglobin   A1c value between 5.7% and 6.4% is consistent with  prediabetes and should be confirmed with a   follow-up test.     For someone with known diabetes, a value <7%  indicates that their diabetes is well controlled. A1c  targets should be individualized based on duration of  diabetes, age, comorbid conditions, and other  considerations.     This assay result is consistent with an increased risk  of diabetes.     Currently, no consensus exists regarding use of  hemoglobin A1c for diagnosis of diabetes for children.              Patient Outreach on 11/01/2024   Component Date Value Ref Range Status    Left Eye DM Retinopathy 10/31/2024 Negative   Final    Right Eye DM Retinopathy 10/31/2024 Negative   Final       Past Medical History:   Diagnosis Date    Allergy     Hyperlipidemia     Hypertension      Social History     Socioeconomic History    Marital status: Single   Tobacco Use    Smoking status: Never    Smokeless tobacco: Never   Substance and Sexual Activity    Alcohol use: Yes     Alcohol/week: 21.0 standard drinks of alcohol     Types: 21 Glasses of wine per week     Social Drivers of Health     Financial Resource Strain: Low Risk  (1/16/2025)    Overall Financial Resource Strain (CARDIA)     Difficulty of Paying Living Expenses:  Not hard at all   Food Insecurity: No Food Insecurity (1/16/2025)    Hunger Vital Sign     Worried About Running Out of Food in the Last Year: Never true     Ran Out of Food in the Last Year: Never true   Physical Activity: Inactive (1/16/2025)    Exercise Vital Sign     Days of Exercise per Week: 0 days     Minutes of Exercise per Session: 0 min   Stress: No Stress Concern Present (1/16/2025)    [employer] [place] [employer] - Occupational Stress Questionnaire     Feeling of Stress : Not at all   Housing Stability: Unknown (1/16/2025)    Housing Stability Vital Sign     Unable to Pay for Housing in the Last Year: No     History reviewed. No pertinent surgical history.  No family history on file.    Review of patient's allergies indicates:   Allergen Reactions    Clindamycin hcl      Other reaction(s): Unknown    Penicillin g potassium      Other reaction(s): Unknown       Current Outpatient Medications:     amlodipine-benazepril 5-20 mg (LOTREL) 5-20 mg per capsule, Take 1 capsule by mouth once daily., Disp: 90 capsule, Rfl: 3    aspirin 81 MG Chew, 1 tablet, Disp: , Rfl:     atenoloL (TENORMIN) 25 MG tablet, Take one tablet po daily, Disp: 90 tablet, Rfl: 3    fluticasone propionate (FLONASE) 50 mcg/actuation nasal spray, 1 spray in each nostril, Disp: , Rfl:     multivitamin-iron-folic acid Tab, 1 tablet, Disp: , Rfl:     pravastatin (PRAVACHOL) 40 MG tablet, Take 1 tablet (40 mg total) by mouth every evening., Disp: 90 tablet, Rfl: 1    ammonium lactate (LAC-HYDRIN) 12 % lotion, Apply topically once daily., Disp: 225 g, Rfl: 1    Review of Systems   Constitutional:  Negative for activity change, appetite change and unexpected weight change.   HENT:  Positive for hearing loss (right ear). Negative for ear pain, rhinorrhea and trouble swallowing.    Eyes:  Negative for discharge and visual disturbance.   Respiratory:  Negative for chest tightness, shortness of breath and wheezing.    Cardiovascular:   "Negative for chest pain, palpitations and leg swelling.   Gastrointestinal:  Negative for blood in stool, constipation, diarrhea and vomiting.        -heartburn   Endocrine: Negative for polydipsia and polyuria.   Genitourinary:  Negative for decreased urine volume, difficulty urinating, dysuria, frequency, hematuria and urgency.   Musculoskeletal:  Positive for arthralgias. Negative for back pain, joint swelling and neck pain.   Skin:  Positive for rash (on legs).   Neurological:  Negative for dizziness, weakness and headaches.   Hematological:  Does not bruise/bleed easily.   Psychiatric/Behavioral:  Negative for behavioral problems, confusion, dysphoric mood, sleep disturbance and suicidal ideas. The patient is not nervous/anxious.           Objective:      Vitals:    01/23/25 1453   BP: 130/66   Pulse: 80   SpO2: 99%   Weight: 103.9 kg (229 lb)   Height: 6' 2" (1.88 m)           Wt Readings from Last 3 Encounters:   01/23/25 103.9 kg (229 lb)   07/18/24 101.6 kg (224 lb)   01/18/24 99.3 kg (219 lb)       Physical Exam  Vitals reviewed.   Constitutional:       General: He is not in acute distress.     Appearance: Normal appearance. He is well-developed.   HENT:      Head: Normocephalic and atraumatic.      Right Ear: Ear canal normal. There is no impacted cerumen.      Left Ear: Tympanic membrane and ear canal normal. There is no impacted cerumen.   Neck:      Thyroid: No thyromegaly.   Cardiovascular:      Rate and Rhythm: Normal rate and regular rhythm.      Heart sounds: Normal heart sounds. No murmur heard.     No friction rub.   Pulmonary:      Effort: Pulmonary effort is normal.      Breath sounds: Normal breath sounds. No wheezing or rales.   Abdominal:      General: Bowel sounds are normal. There is no distension.      Palpations: Abdomen is soft.      Tenderness: There is no abdominal tenderness.   Musculoskeletal:      Cervical back: Neck supple.      Right lower leg: Edema (trace) present.      Left " lower leg: Edema (trace) present.   Lymphadenopathy:      Cervical: No cervical adenopathy.   Skin:     General: Skin is warm and dry.      Findings: Rash (crusty on both legs) present.   Neurological:      Mental Status: He is alert and oriented to person, place, and time.   Psychiatric:         Attention and Perception: He is attentive.         Speech: Speech normal.         Behavior: Behavior normal.         Thought Content: Thought content normal.         Judgment: Judgment normal.           Assessment:       1. Type 2 diabetes mellitus without complication, without long-term current use of insulin    2. Essential hypertension    3. Mixed hyperlipidemia    4. Arthritis of knee    5. Dermatitis                 Plan:       Type 2 diabetes mellitus without complication, without long-term current use of insulin  Comments:  Controlled. A1c 5.7%. To continue ADA diet. Will continue to monitor A1c.    Essential hypertension  Comments:  Controlled. Will continue to monitor BP on current medication regimen    Mixed hyperlipidemia  Comments:  Optimal. LDL 85. To continue pravachol.    Arthritis of knee  Comments:  Chronic. No new issues.    Dermatitis  Comments:  Chronic. Will give lachydrin and monitor rash for resolution  Orders:  -     ammonium lactate (LAC-HYDRIN) 12 % lotion; Apply topically once daily.  Dispense: 225 g; Refill: 1      Other  Lab results discussed and reviewed with patient.    Follow up in about 6 months (around 7/23/2025) for DM, HTN, Arthritis.        1/23/2025 Kel Gongora

## 2025-01-23 ENCOUNTER — OFFICE VISIT (OUTPATIENT)
Dept: FAMILY MEDICINE | Facility: CLINIC | Age: 85
End: 2025-01-23
Payer: MEDICARE

## 2025-01-23 VITALS
OXYGEN SATURATION: 99 % | HEIGHT: 74 IN | BODY MASS INDEX: 29.39 KG/M2 | HEART RATE: 80 BPM | DIASTOLIC BLOOD PRESSURE: 66 MMHG | WEIGHT: 229 LBS | SYSTOLIC BLOOD PRESSURE: 130 MMHG

## 2025-01-23 DIAGNOSIS — E11.9 TYPE 2 DIABETES MELLITUS WITHOUT COMPLICATION, WITHOUT LONG-TERM CURRENT USE OF INSULIN: Primary | ICD-10-CM

## 2025-01-23 DIAGNOSIS — L30.9 DERMATITIS: ICD-10-CM

## 2025-01-23 DIAGNOSIS — I10 ESSENTIAL HYPERTENSION: ICD-10-CM

## 2025-01-23 DIAGNOSIS — E78.2 MIXED HYPERLIPIDEMIA: ICD-10-CM

## 2025-01-23 DIAGNOSIS — M17.10 ARTHRITIS OF KNEE: ICD-10-CM

## 2025-01-23 PROCEDURE — 99214 OFFICE O/P EST MOD 30 MIN: CPT | Mod: S$GLB,,, | Performed by: FAMILY MEDICINE

## 2025-01-23 RX ORDER — AMMONIUM LACTATE 12 G/100G
LOTION TOPICAL DAILY
Qty: 225 G | Refills: 1 | Status: SHIPPED | OUTPATIENT
Start: 2025-01-23

## 2025-02-21 DIAGNOSIS — Z00.00 ENCOUNTER FOR MEDICARE ANNUAL WELLNESS EXAM: ICD-10-CM

## 2025-04-21 DIAGNOSIS — E78.2 MIXED HYPERLIPIDEMIA: ICD-10-CM

## 2025-04-21 RX ORDER — PRAVASTATIN SODIUM 40 MG/1
40 TABLET ORAL NIGHTLY
Qty: 90 TABLET | Refills: 1 | Status: SHIPPED | OUTPATIENT
Start: 2025-04-21

## 2025-04-21 NOTE — TELEPHONE ENCOUNTER
----- Message from Alma sent at 4/21/2025  2:21 PM CDT -----  Refill for pravastatin. [facility] on [address]. Pt #[phone]

## 2025-04-21 NOTE — TELEPHONE ENCOUNTER
The patient's prescription has been approved and sent to   [facility] [id number] [place] - [address]  [address]  [place] [address]  Phone: [phone] Fax: [phone]

## 2025-07-15 ENCOUNTER — TELEPHONE (OUTPATIENT)
Dept: FAMILY MEDICINE | Facility: CLINIC | Age: 85
End: 2025-07-15
Payer: MEDICARE

## 2025-07-15 DIAGNOSIS — Z79.899 ENCOUNTER FOR LONG-TERM (CURRENT) USE OF MEDICATIONS: Primary | ICD-10-CM

## 2025-07-15 DIAGNOSIS — E11.9 TYPE 2 DIABETES MELLITUS WITHOUT COMPLICATION, WITHOUT LONG-TERM CURRENT USE OF INSULIN: ICD-10-CM

## 2025-07-15 NOTE — TELEPHONE ENCOUNTER
----- Message from Dorene sent at 7/15/2025  8:23 AM CDT -----  Please send [facility] to [facility] [address] for patient. He will be going to get his labs completed tomorrow.   [phone]

## 2025-07-17 LAB
ALBUMIN SERPL-MCNC: 4.2 G/DL (ref 3.6–5.1)
ALBUMIN/GLOB SERPL: 1.6 (CALC) (ref 1–2.5)
ALP SERPL-CCNC: 81 U/L (ref 35–144)
ALT SERPL-CCNC: 15 U/L (ref 9–46)
AST SERPL-CCNC: 15 U/L (ref 10–35)
BILIRUB SERPL-MCNC: 0.7 MG/DL (ref 0.2–1.2)
BUN SERPL-MCNC: 15 MG/DL (ref 7–25)
BUN/CREAT SERPL: ABNORMAL (CALC) (ref 6–22)
CALCIUM SERPL-MCNC: 9 MG/DL (ref 8.6–10.3)
CHLORIDE SERPL-SCNC: 102 MMOL/L (ref 98–110)
CHOLEST SERPL-MCNC: 145 MG/DL
CHOLEST/HDLC SERPL: 2.6 (CALC)
CO2 SERPL-SCNC: 27 MMOL/L (ref 20–32)
CREAT SERPL-MCNC: 0.85 MG/DL (ref 0.7–1.22)
EGFR: 85 ML/MIN/1.73M2
GLOBULIN SER CALC-MCNC: 2.7 G/DL (CALC) (ref 1.9–3.7)
GLUCOSE SERPL-MCNC: 119 MG/DL (ref 65–99)
HBA1C MFR BLD: 5.7 %
HDLC SERPL-MCNC: 55 MG/DL
LDLC SERPL CALC-MCNC: 76 MG/DL (CALC)
NONHDLC SERPL-MCNC: 90 MG/DL (CALC)
POTASSIUM SERPL-SCNC: 5.1 MMOL/L (ref 3.5–5.3)
PROT SERPL-MCNC: 6.9 G/DL (ref 6.1–8.1)
SODIUM SERPL-SCNC: 138 MMOL/L (ref 135–146)
TRIGL SERPL-MCNC: 60 MG/DL

## 2025-07-29 ENCOUNTER — TELEPHONE (OUTPATIENT)
Dept: FAMILY MEDICINE | Facility: CLINIC | Age: 85
End: 2025-07-29
Payer: MEDICARE

## 2025-07-29 NOTE — TELEPHONE ENCOUNTER
----- Message from Marion sent at 7/29/2025  8:31 AM CDT -----  The patient said this is the 2 nd time we have had to reschedule his appointment. He would like to be seen sooner than September. That's Dr. Gongora's next appointment. Pt's # [phone]

## 2025-07-31 DIAGNOSIS — L30.9 DERMATITIS: ICD-10-CM

## 2025-07-31 RX ORDER — AMMONIUM LACTATE 12 G/100G
LOTION TOPICAL DAILY
Qty: 225 G | Refills: 1 | Status: SHIPPED | OUTPATIENT
Start: 2025-07-31

## 2025-07-31 NOTE — TELEPHONE ENCOUNTER
----- Message from Dorene sent at 7/31/2025 10:15 AM CDT -----  Patient calling in regarding to needing a refill on his imodium   Yas samano   [phone]

## 2025-08-07 ENCOUNTER — OFFICE VISIT (OUTPATIENT)
Dept: FAMILY MEDICINE | Facility: CLINIC | Age: 85
End: 2025-08-07
Payer: MEDICARE

## 2025-08-07 VITALS
OXYGEN SATURATION: 100 % | DIASTOLIC BLOOD PRESSURE: 56 MMHG | SYSTOLIC BLOOD PRESSURE: 128 MMHG | HEIGHT: 74 IN | WEIGHT: 223 LBS | BODY MASS INDEX: 28.62 KG/M2 | HEART RATE: 65 BPM

## 2025-08-07 DIAGNOSIS — E78.2 MIXED HYPERLIPIDEMIA: ICD-10-CM

## 2025-08-07 DIAGNOSIS — I10 ESSENTIAL HYPERTENSION: ICD-10-CM

## 2025-08-07 DIAGNOSIS — E11.9 TYPE 2 DIABETES MELLITUS WITHOUT COMPLICATION, WITHOUT LONG-TERM CURRENT USE OF INSULIN: Primary | ICD-10-CM

## 2025-08-07 PROCEDURE — 99214 OFFICE O/P EST MOD 30 MIN: CPT | Mod: S$GLB,,, | Performed by: FAMILY MEDICINE

## 2025-08-07 RX ORDER — PRAVASTATIN SODIUM 40 MG/1
40 TABLET ORAL NIGHTLY
Qty: 90 TABLET | Refills: 3 | Status: SHIPPED | OUTPATIENT
Start: 2025-08-07

## 2025-08-07 NOTE — PROGRESS NOTES
SUBJECTIVE:    Patient ID: Watson Mcgee is a 84 y.o. male.    Chief Complaint: 4M DM Check Up  Lab Review and Trouble with Finger (-Arthritis?)    Pt here to checkup on acute and chronic problems      Right ear is doing ok. Still having trouble keeping it from being impacted.     Has had labs done: A1c 5.7%, fBS 119, GFR 85, LDL 76  Diabetes is doing ok. Not checking blood sugar. Not doing any exercise. Was trying to ride a bike but his knees are getting too unpredictable.     Has a rash on his leg that is crusty, puts a cream otc on it.     Has trouble with knees, takes bioflex.  Fell once since last visit by losing his visit and hit the top of his head, was in the bathroom.  Not using a cane.     Has nasal congestion at night. Has been using flonase.  Otherwise has been doing ok.     --------------------------------------------------------  Taking AREDs macular degeneration every 6 months. (Mery)      Telephone on 07/15/2025   Component Date Value Ref Range Status    Glucose 07/16/2025 119 (H)  65 - 99 mg/dL Final    Comment:               Fasting reference interval     For someone without known diabetes, a glucose value  between 100 and 125 mg/dL is consistent with  prediabetes and should be confirmed with a  follow-up test.         BUN 07/16/2025 15  7 - 25 mg/dL Final    Creatinine 07/16/2025 0.85  0.70 - 1.22 mg/dL Final    eGFR 07/16/2025 85  > OR = 60 mL/min/1.73m2 Final    BUN/Creatinine Ratio 07/16/2025 SEE NOTE:  6 - 22 (calc) Final    Comment:    Not Reported: BUN and Creatinine are within     reference range.            Sodium 07/16/2025 138  135 - 146 mmol/L Final    Potassium 07/16/2025 5.1  3.5 - 5.3 mmol/L Final    Chloride 07/16/2025 102  98 - 110 mmol/L Final    CO2 07/16/2025 27  20 - 32 mmol/L Final    Calcium 07/16/2025 9.0  8.6 - 10.3 mg/dL Final    Total Protein 07/16/2025 6.9  6.1 - 8.1 g/dL Final    Albumin 07/16/2025 4.2  3.6 - 5.1 g/dL Final    Globulin, Total 07/16/2025 2.7   1.9 - 3.7 g/dL (calc) Final    Albumin/Globulin Ratio 07/16/2025 1.6  1.0 - 2.5 (calc) Final    Total Bilirubin 07/16/2025 0.7  0.2 - 1.2 mg/dL Final    Alkaline Phosphatase 07/16/2025 81  35 - 144 U/L Final    AST 07/16/2025 15  10 - 35 U/L Final    ALT 07/16/2025 15  9 - 46 U/L Final    Cholesterol 07/16/2025 145  <200 mg/dL Final    HDL 07/16/2025 55  > OR = 40 mg/dL Final    Triglycerides 07/16/2025 60  <150 mg/dL Final    LDL Cholesterol 07/16/2025 76  mg/dL (calc) Final    Comment: Reference range: <100     Desirable range <100 mg/dL for primary prevention;    <70 mg/dL for patients with CHD or diabetic patients   with > or = 2 CHD risk factors.     LDL-C is now calculated using the Bernard-Karissa   calculation, which is a validated novel method providing   better accuracy than the Friedewald equation in the   estimation of LDL-C.   Bernard BUNN et al. SHAQUILLE. 2013;310(19): 5916-9362   (http://education.imedo.Stadionaut/faq/TMC212)      HDL/Cholesterol Ratio 07/16/2025 2.6  <5.0 (calc) Final    Non HDL Chol. (LDL+VLDL) 07/16/2025 90  <130 mg/dL (calc) Final    Comment: For patients with diabetes plus 1 major ASCVD risk   factor, treating to a non-HDL-C goal of <100 mg/dL   (LDL-C of <70 mg/dL) is considered a therapeutic   option.      Hemoglobin A1C 07/16/2025 5.7 (H)  <5.7 % Final    Comment: For someone without known diabetes, a hemoglobin   A1c value between 5.7% and 6.4% is consistent with  prediabetes and should be confirmed with a   follow-up test.     For someone with known diabetes, a value <7%  indicates that their diabetes is well controlled. A1c  targets should be individualized based on duration of  diabetes, age, comorbid conditions, and other  considerations.     This assay result is consistent with an increased risk  of diabetes.     Currently, no consensus exists regarding use of  hemoglobin A1c for diagnosis of diabetes for children.              Telephone on 07/15/2025   Component Date Value  Ref Range Status    Glucose 07/16/2025 119 (H)  65 - 99 mg/dL Final    BUN 07/16/2025 15  7 - 25 mg/dL Final    Creatinine 07/16/2025 0.85  0.70 - 1.22 mg/dL Final    eGFR 07/16/2025 85  > OR = 60 mL/min/1.73m2 Final    BUN/Creatinine Ratio 07/16/2025 SEE NOTE:  6 - 22 (calc) Final    Sodium 07/16/2025 138  135 - 146 mmol/L Final    Potassium 07/16/2025 5.1  3.5 - 5.3 mmol/L Final    Chloride 07/16/2025 102  98 - 110 mmol/L Final    CO2 07/16/2025 27  20 - 32 mmol/L Final    Calcium 07/16/2025 9.0  8.6 - 10.3 mg/dL Final    Total Protein 07/16/2025 6.9  6.1 - 8.1 g/dL Final    Albumin 07/16/2025 4.2  3.6 - 5.1 g/dL Final    Globulin, Total 07/16/2025 2.7  1.9 - 3.7 g/dL (calc) Final    Albumin/Globulin Ratio 07/16/2025 1.6  1.0 - 2.5 (calc) Final    Total Bilirubin 07/16/2025 0.7  0.2 - 1.2 mg/dL Final    Alkaline Phosphatase 07/16/2025 81  35 - 144 U/L Final    AST 07/16/2025 15  10 - 35 U/L Final    ALT 07/16/2025 15  9 - 46 U/L Final    Cholesterol 07/16/2025 145  <200 mg/dL Final    HDL 07/16/2025 55  > OR = 40 mg/dL Final    Triglycerides 07/16/2025 60  <150 mg/dL Final    LDL Cholesterol 07/16/2025 76  mg/dL (calc) Final    HDL/Cholesterol Ratio 07/16/2025 2.6  <5.0 (calc) Final    Non HDL Chol. (LDL+VLDL) 07/16/2025 90  <130 mg/dL (calc) Final    Hemoglobin A1C 07/16/2025 5.7 (H)  <5.7 % Final       Past Medical History:   Diagnosis Date    Allergy     Hyperlipidemia     Hypertension      Social History     Socioeconomic History    Marital status: Single   Tobacco Use    Smoking status: Never    Smokeless tobacco: Never   Substance and Sexual Activity    Alcohol use: Yes     Alcohol/week: 21.0 standard drinks of alcohol     Types: 21 Glasses of wine per week     Social Drivers of Health     Financial Resource Strain: Low Risk  (1/16/2025)    Overall Financial Resource Strain (CARDIA)     Difficulty of Paying Living Expenses: Not hard at all   Food Insecurity: No Food Insecurity (1/16/2025)    Hunger Vital  Sign     Worried About Running Out of Food in the Last Year: Never true     Ran Out of Food in the Last Year: Never true   Physical Activity: Inactive (1/16/2025)    Exercise Vital Sign     Days of Exercise per Week: 0 days     Minutes of Exercise per Session: 0 min   Stress: No Stress Concern Present (1/16/2025)    [employer] - Occupational Stress Questionnaire     Feeling of Stress : Not at all   Housing Stability: Unknown (1/16/2025)    Housing Stability Vital Sign     Unable to Pay for Housing in the Last Year: No     History reviewed. No pertinent surgical history.  No family history on file.    Review of patient's allergies indicates:   Allergen Reactions    Clindamycin hcl      Other reaction(s): Unknown    Penicillin g potassium      Other reaction(s): Unknown       Current Outpatient Medications:     amlodipine-benazepril 5-20 mg (LOTREL) 5-20 mg per capsule, Take 1 capsule by mouth once daily., Disp: 90 capsule, Rfl: 3    ammonium lactate (LAC-HYDRIN) 12 % lotion, Apply topically once daily., Disp: 225 g, Rfl: 1    aspirin 81 MG Chew, 1 tablet, Disp: , Rfl:     atenoloL (TENORMIN) 25 MG tablet, Take one tablet po daily, Disp: 90 tablet, Rfl: 3    fluticasone propionate (FLONASE) 50 mcg/actuation nasal spray, 1 spray in each nostril, Disp: , Rfl:     multivitamin-iron-folic acid Tab, 1 tablet, Disp: , Rfl:     pravastatin (PRAVACHOL) 40 MG tablet, Take 1 tablet (40 mg total) by mouth every evening., Disp: 90 tablet, Rfl: 3    Review of Systems   Constitutional:  Negative for activity change, appetite change and unexpected weight change.   HENT:  Positive for hearing loss (right ear). Negative for ear pain, rhinorrhea and trouble swallowing.    Eyes:  Negative for discharge and visual disturbance.   Respiratory:  Negative for chest tightness, shortness of breath and wheezing.    Cardiovascular:  Negative for chest pain, palpitations and leg swelling.   Gastrointestinal:   "Negative for blood in stool, constipation, diarrhea and vomiting.        -heartburn   Endocrine: Negative for polydipsia and polyuria.   Genitourinary:  Negative for decreased urine volume, difficulty urinating, dysuria, frequency, hematuria and urgency.   Musculoskeletal:  Positive for arthralgias. Negative for back pain, joint swelling and neck pain.   Skin:  Positive for rash (on legs).   Neurological:  Negative for dizziness, weakness and headaches.   Hematological:  Does not bruise/bleed easily.   Psychiatric/Behavioral:  Negative for behavioral problems, confusion, dysphoric mood, sleep disturbance and suicidal ideas. The patient is not nervous/anxious.           Objective:      Vitals:    08/07/25 1138   BP: (!) 128/56   Pulse: 65   SpO2: 100%   Weight: 101.2 kg (223 lb)   Height: 6' 2" (1.88 m)         Wt Readings from Last 3 Encounters:   08/07/25 101.2 kg (223 lb)   01/23/25 103.9 kg (229 lb)   07/18/24 101.6 kg (224 lb)       Physical Exam  Vitals reviewed.   Constitutional:       General: He is not in acute distress.     Appearance: Normal appearance. He is well-developed.   HENT:      Head: Normocephalic and atraumatic.      Right Ear: Ear canal normal. There is no impacted cerumen.      Left Ear: Tympanic membrane and ear canal normal. There is no impacted cerumen.   Neck:      Thyroid: No thyromegaly.   Cardiovascular:      Rate and Rhythm: Normal rate and regular rhythm.      Heart sounds: Normal heart sounds. No murmur heard.     No friction rub.   Pulmonary:      Effort: Pulmonary effort is normal.      Breath sounds: Normal breath sounds. No wheezing or rales.   Abdominal:      General: Bowel sounds are normal. There is no distension.      Palpations: Abdomen is soft.      Tenderness: There is no abdominal tenderness.   Musculoskeletal:      Cervical back: Neck supple.      Right lower leg: Edema (trace) present.      Left lower leg: Edema (trace) present.   Lymphadenopathy:      Cervical: No " cervical adenopathy.   Skin:     General: Skin is warm and dry.      Findings: Rash (crusty on both legs) present.   Neurological:      Mental Status: He is alert and oriented to person, place, and time.   Psychiatric:         Attention and Perception: He is attentive.         Speech: Speech normal.         Behavior: Behavior normal.         Thought Content: Thought content normal.         Judgment: Judgment normal.           Assessment:       1. Type 2 diabetes mellitus without complication, without long-term current use of insulin    2. Mixed hyperlipidemia    3. Essential hypertension               Plan:       Type 2 diabetes mellitus without complication, without long-term current use of insulin  Comments:  Controlled. A1c 5.7%. To ADA diet, regular exercise. Will continue to monitor A1c.    Mixed hyperlipidemia  Comments:  Controlled. LDL 76. To continue statin.  Orders:  -     pravastatin (PRAVACHOL) 40 MG tablet; Take 1 tablet (40 mg total) by mouth every evening.  Dispense: 90 tablet; Refill: 3    Essential hypertension  Comments:  Controlled. Will continue to monitor BP on current medication regimen        Other  Lab results discussed and reviewed with patient.    Follow up in about 6 months (around 2/7/2026) for HTN, DM.        8/7/2025 Kel Gongora